# Patient Record
Sex: MALE | Race: BLACK OR AFRICAN AMERICAN | NOT HISPANIC OR LATINO | URBAN - METROPOLITAN AREA
[De-identification: names, ages, dates, MRNs, and addresses within clinical notes are randomized per-mention and may not be internally consistent; named-entity substitution may affect disease eponyms.]

---

## 2021-08-13 ENCOUNTER — IMMUNIZATION (OUTPATIENT)
Dept: PRIMARY CARE CLINIC | Facility: CLINIC | Age: 56
End: 2021-08-13

## 2021-08-13 DIAGNOSIS — Z23 NEED FOR VACCINATION: Primary | ICD-10-CM

## 2021-08-13 PROCEDURE — 0031A COVID-19,VECTOR-NR,RS-AD26,PF,0.5 ML DOSE VACCINE (JANSSEN): CPT | Mod: CV19,S$GLB,, | Performed by: INTERNAL MEDICINE

## 2021-08-13 PROCEDURE — 0031A COVID-19,VECTOR-NR,RS-AD26,PF,0.5 ML DOSE VACCINE (JANSSEN): ICD-10-PCS | Mod: CV19,S$GLB,, | Performed by: INTERNAL MEDICINE

## 2021-08-13 PROCEDURE — 91303 COVID-19,VECTOR-NR,RS-AD26,PF,0.5 ML DOSE VACCINE (JANSSEN): ICD-10-PCS | Mod: S$GLB,,, | Performed by: INTERNAL MEDICINE

## 2021-08-13 PROCEDURE — 91303 COVID-19,VECTOR-NR,RS-AD26,PF,0.5 ML DOSE VACCINE (JANSSEN): CPT | Mod: S$GLB,,, | Performed by: INTERNAL MEDICINE

## 2023-10-17 PROBLEM — M35.01: Status: ACTIVE | Noted: 2023-10-17

## 2023-10-17 PROBLEM — H16.221: Status: ACTIVE | Noted: 2023-10-17

## 2023-10-17 PROBLEM — H52.203 ASTIGMATISM, BILATERAL: Status: ACTIVE | Noted: 2023-10-17

## 2023-10-17 PROBLEM — H52.13 BILATERAL MYOPIA: Status: ACTIVE | Noted: 2023-10-17

## 2023-10-17 PROBLEM — H52.4 BILATERAL PRESBYOPIA: Status: ACTIVE | Noted: 2023-10-17

## 2023-10-17 PROBLEM — D23.9 HYDROCYSTOMA: Status: ACTIVE | Noted: 2023-10-17

## 2023-10-17 PROBLEM — H02.821 CYST OF RIGHT UPPER EYELID: Status: ACTIVE | Noted: 2023-10-17

## 2023-10-17 PROBLEM — H01.009 BLEPHARITIS: Status: ACTIVE | Noted: 2023-10-17

## 2023-10-17 RX ORDER — MUPIROCIN 20 MG/G
OINTMENT TOPICAL
COMMUNITY
Start: 2021-09-23 | End: 2024-05-16 | Stop reason: ALTCHOICE

## 2023-10-17 RX ORDER — GABAPENTIN 300 MG/1
CAPSULE ORAL
COMMUNITY
Start: 2021-10-27 | End: 2024-05-16 | Stop reason: ALTCHOICE

## 2023-10-17 RX ORDER — ATORVASTATIN CALCIUM 20 MG/1
20 TABLET, FILM COATED ORAL
COMMUNITY
Start: 2021-06-30

## 2023-10-17 RX ORDER — ERYTHROMYCIN 5 MG/G
OINTMENT OPHTHALMIC
COMMUNITY
Start: 2021-07-12 | End: 2024-05-16 | Stop reason: ALTCHOICE

## 2023-10-18 ENCOUNTER — ANCILLARY PROCEDURE (OUTPATIENT)
Dept: RADIOLOGY | Facility: CLINIC | Age: 58
End: 2023-10-18
Payer: COMMERCIAL

## 2023-10-18 ENCOUNTER — OFFICE VISIT (OUTPATIENT)
Dept: ORTHOPEDIC SURGERY | Facility: CLINIC | Age: 58
End: 2023-10-18
Payer: COMMERCIAL

## 2023-10-18 DIAGNOSIS — M79.641 HAND PAIN, RIGHT: ICD-10-CM

## 2023-10-18 DIAGNOSIS — M65.351 TRIGGER LITTLE FINGER OF RIGHT HAND: Primary | ICD-10-CM

## 2023-10-18 PROCEDURE — 73130 X-RAY EXAM OF HAND: CPT | Mod: RIGHT SIDE | Performed by: RADIOLOGY

## 2023-10-18 PROCEDURE — 99203 OFFICE O/P NEW LOW 30 MIN: CPT | Performed by: FAMILY MEDICINE

## 2023-10-18 PROCEDURE — 20550 NJX 1 TENDON SHEATH/LIGAMENT: CPT | Performed by: FAMILY MEDICINE

## 2023-10-18 PROCEDURE — 73130 X-RAY EXAM OF HAND: CPT | Mod: RT,FY

## 2023-10-18 RX ORDER — TRIAMCINOLONE ACETONIDE 40 MG/ML
20 INJECTION, SUSPENSION INTRA-ARTICULAR; INTRAMUSCULAR
Status: COMPLETED | OUTPATIENT
Start: 2023-10-18 | End: 2023-10-18

## 2023-10-18 RX ORDER — LIDOCAINE HYDROCHLORIDE 10 MG/ML
0.5 INJECTION INFILTRATION; PERINEURAL
Status: COMPLETED | OUTPATIENT
Start: 2023-10-18 | End: 2023-10-18

## 2023-10-18 RX ADMIN — TRIAMCINOLONE ACETONIDE 20 MG: 40 INJECTION, SUSPENSION INTRA-ARTICULAR; INTRAMUSCULAR at 13:40

## 2023-10-18 RX ADMIN — LIDOCAINE HYDROCHLORIDE 0.5 ML: 10 INJECTION INFILTRATION; PERINEURAL at 13:40

## 2023-10-18 NOTE — PROGRESS NOTES
** Please excuse any errors in grammar or translation related to this dictation. Voice recognition software was utilized to prepare this document. **    Assessment & Plan:  Clinical presentation most consistent with trigger finger of right 5th finger. Explained to patient that this is due to finger flexor tendon becoming impinged within its pulley system.  Reviewed treatment options to include observation, wearing of trigger finger splint, and steroid injection. As patient feels symptoms have progressed since onset, elected for steroid injection as performed below. Discussed with patient that associated pain should soon resolve but that catching/locking symptoms can take a few weeks.  For persistence or recurrence of symptoms, may benefit from surgical release. Return precautions given. F/u as needed.    Chief complaint: R pinky locking    HPI:  58-year-old male right-hand-dominant presents with locking of the right pinky finger.  This has been ongoing for the past month.  Prior to that noted pain within his pinky dating back at least a year.  He reports a remote history of hand trauma while playing baseball as a youth which caused deformities in his third through fifth digits.  However the pain over the past year and now the locking are new symptoms.    Exam:  Right hand reveals  ulnar deviation of 3rd and 5th fingers. Tenderness over the A1 pulleys of the right little finger. Active triggering with flexion and extension today of little finger. Subjective sensation is normal.  - Tinel's of median nerve.   Normal r/u/m nerve motor activation.   2+ radial and ulnar pulses.     Results:  X-rays of right hand reviewed and interpreted as no acute fracture.  Chronic abnormalities of the third and fifth digits, suspect remote fifth PIP dislocation event.    Procedure:  Patient ID: Yakov Mendes is a 58 y.o. male.    Hand / UE Inj/Asp: R small A1 for trigger finger on 10/18/2023 1:40 PM  Indications: pain and tendon  swelling  Details: 25 G needle, volar approach  Medications: 0.5 mL lidocaine 10 mg/mL (1 %); 20 mg triamcinolone acetonide 40 mg/mL  Outcome: tolerated well, no immediate complications  Procedure, treatment alternatives, risks and benefits explained, specific risks discussed. Consent was given by the patient. Immediately prior to procedure a time out was called to verify the correct patient, procedure, equipment, support staff and site/side marked as required. Patient was prepped and draped in the usual sterile fashion.

## 2023-11-27 ENCOUNTER — OFFICE VISIT (OUTPATIENT)
Dept: OTOLARYNGOLOGY | Facility: HOSPITAL | Age: 58
End: 2023-11-27
Payer: COMMERCIAL

## 2023-11-27 VITALS — TEMPERATURE: 97.9 F | WEIGHT: 230.3 LBS | HEIGHT: 72 IN | BODY MASS INDEX: 31.19 KG/M2

## 2023-11-27 DIAGNOSIS — R13.12 OROPHARYNGEAL DYSPHAGIA: ICD-10-CM

## 2023-11-27 DIAGNOSIS — L29.9 EAR ITCHING: ICD-10-CM

## 2023-11-27 DIAGNOSIS — R09.A2 GLOBUS PHARYNGEUS: ICD-10-CM

## 2023-11-27 DIAGNOSIS — K21.9 LARYNGOPHARYNGEAL REFLUX (LPR): Primary | ICD-10-CM

## 2023-11-27 PROCEDURE — 99203 OFFICE O/P NEW LOW 30 MIN: CPT | Performed by: OTOLARYNGOLOGY

## 2023-11-27 PROCEDURE — 99213 OFFICE O/P EST LOW 20 MIN: CPT | Performed by: OTOLARYNGOLOGY

## 2023-11-27 PROCEDURE — 31579 LARYNGOSCOPY TELESCOPIC: CPT | Performed by: OTOLARYNGOLOGY

## 2023-11-27 RX ORDER — SILDENAFIL 100 MG/1
TABLET, FILM COATED ORAL
COMMUNITY
Start: 2022-04-04

## 2023-11-27 RX ORDER — ACETAMINOPHEN 500 MG
1000 TABLET ORAL 3 TIMES DAILY
COMMUNITY
Start: 2022-03-26 | End: 2024-05-16 | Stop reason: ALTCHOICE

## 2023-11-27 RX ORDER — DOCUSATE SODIUM 100 MG/1
100 CAPSULE, LIQUID FILLED ORAL 2 TIMES DAILY
COMMUNITY
Start: 2022-03-24 | End: 2024-05-16 | Stop reason: ALTCHOICE

## 2023-11-27 RX ORDER — AMLODIPINE BESYLATE 5 MG/1
TABLET ORAL DAILY
COMMUNITY
Start: 2023-09-01

## 2023-11-27 RX ORDER — MAGNESIUM CARB/ALUMINUM HYDROX 105-160MG
1 TABLET,CHEWABLE ORAL
Qty: 270 TABLET | Refills: 0 | Status: SHIPPED | OUTPATIENT
Start: 2023-11-27 | End: 2024-02-25

## 2023-11-27 RX ORDER — POLYMYXIN B SULFATE AND TRIMETHOPRIM 1; 10000 MG/ML; [USP'U]/ML
SOLUTION OPHTHALMIC
COMMUNITY
Start: 2021-03-09 | End: 2024-05-16 | Stop reason: ALTCHOICE

## 2023-11-27 RX ORDER — TRIAMTERENE AND HYDROCHLOROTHIAZIDE 37.5; 25 MG/1; MG/1
1 CAPSULE ORAL
COMMUNITY
Start: 2023-09-01

## 2023-11-27 RX ORDER — FENOFIBRATE 48 MG/1
48 TABLET, FILM COATED ORAL
COMMUNITY
Start: 2023-09-01

## 2023-11-27 RX ORDER — LORATADINE 10 MG/1
10 TABLET ORAL
COMMUNITY
Start: 2021-12-29 | End: 2024-05-16 | Stop reason: ALTCHOICE

## 2023-11-27 RX ORDER — DEXAMETHASONE SODIUM PHOSPHATE 1 MG/ML
SOLUTION/ DROPS OPHTHALMIC
COMMUNITY
Start: 2022-04-04 | End: 2024-05-16 | Stop reason: ALTCHOICE

## 2023-11-27 RX ORDER — OMEPRAZOLE 20 MG/1
40 CAPSULE, DELAYED RELEASE ORAL
COMMUNITY
Start: 2019-03-19 | End: 2024-05-16 | Stop reason: ALTCHOICE

## 2023-11-27 RX ORDER — HEPARIN SODIUM 5000 [USP'U]/ML
5000 INJECTION, SOLUTION INTRAVENOUS; SUBCUTANEOUS 2 TIMES DAILY
COMMUNITY
Start: 2022-03-24 | End: 2024-05-16 | Stop reason: ALTCHOICE

## 2023-11-27 RX ORDER — AMLODIPINE BESYLATE 10 MG/1
10 TABLET ORAL
COMMUNITY
Start: 2022-04-04 | End: 2023-11-27 | Stop reason: DRUGHIGH

## 2023-11-27 RX ORDER — NALOXONE HYDROCHLORIDE 4 MG/.1ML
SPRAY NASAL
COMMUNITY
Start: 2022-03-24 | End: 2024-05-16 | Stop reason: ALTCHOICE

## 2023-11-27 RX ORDER — CIPROFLOXACIN HYDROCHLORIDE AND HYDROCORTISONE 2; 10 MG/ML; MG/ML
SUSPENSION AURICULAR (OTIC)
COMMUNITY
Start: 2023-04-03 | End: 2024-05-16 | Stop reason: ALTCHOICE

## 2023-11-27 RX ORDER — FLUTICASONE PROPIONATE 50 MCG
SPRAY, SUSPENSION (ML) NASAL
COMMUNITY
Start: 2023-09-01 | End: 2024-05-16 | Stop reason: ALTCHOICE

## 2023-11-27 ASSESSMENT — PATIENT HEALTH QUESTIONNAIRE - PHQ9
2. FEELING DOWN, DEPRESSED OR HOPELESS: NOT AT ALL
SUM OF ALL RESPONSES TO PHQ9 QUESTIONS 1 & 2: 0
1. LITTLE INTEREST OR PLEASURE IN DOING THINGS: NOT AT ALL

## 2023-11-27 NOTE — PROGRESS NOTES
ASSESSMENT AND PLAN:   Yakov Mendes is a 58 y.o. male presenting for an initial visit with findings of discomfort in the ears that has been ongoing with itchiness. He uses q-tips frequently. He tries to massae the ear canals wit little benefit.   He reports swallow dysfunction with thickened mucus in the morning    Today's examination to include stroboscopy demonstrates a contusion on the left TM and hair on the TM on the right. He has bilateral vocal cord mobility and mild MTD and reflux related changes.     We discussed avoiding qtip use and use of steroid creams and mineral oil. We recommended adding Gaviscon to his current reflux management strategies.    I would like to see the patient back as needed.        Reason For Consult  Chief Complaint   Patient presents with    Follow-up     Difficulty swallowing.          HISTORY OF PRESENT ILLNESS:  Yakov Mendes is a 58 y.o. male presenting for an initial visit with me for swallowing dysfunction.      The patient reports discomfort in the neck that radiates to the ear. This has been present for a year. He reports that he has to shake his ear to improve the symptoms. He has a sensation of deep itch in the ear. He uses q-tips to clean his ears. No muffled hearing.       He has swallowing issues, worse in the morning. This is both with solids and liquids. He reports choking and regurgitation improve his symptoms.      He mistrusts medical doctors due to questionable nerve pain after a complication of medical care previously   Past Medical History  He has a past medical history of Personal history of other diseases of the circulatory system and Personal history of other endocrine, nutritional and metabolic disease. Surgical History  He has a past surgical history that includes Other surgical history (03/11/2021) and CT angio neck (12/4/2018).   Social History  He has no history on file for tobacco use, alcohol use, and drug use. Allergies  Patient has no  known allergies.     Family History  Family History   Problem Relation Name Age of Onset    Diabetes Mother      Diabetes Father          Review of Systems  All 10 systems were reviewed and negative except for above.      Physical Exam    ENT Physical Exam  Constitutional  Appearance: patient appears well-developed and well-nourished,  Head and Face  Appearance: head appears normal and face appears normal;  Ear  Auricles: right auricle normal; left auricle normal;  Ear comments: Left TM with contusion; hair on the right TM  Nose  External Nose: nares patent bilaterally;  Oral Cavity/Oropharynx  Lips: normal;  Neck  Neck: neck normal; neck palpation normal;  Respiratory  Inspection: no retractions visible;  Cardiovascular  Inspection: no peripheral edema present;  Neurovestibular  Mental Status: alert and oriented;  Psychiatric: mood normal;  Cranial Nerves: cranial nerves intact;        Last Recorded Vitals  There were no vitals taken for this visit.       ----------------------------------------------------------------------  Procedures     Flexible Laryngoscopy w/ Videostroboscopy    VOICE AND SPEECH CHARACTERISTICS:  Normal spoken speech, (+) mild dysphonia, (+) mild roughness, no breathiness, no asthenia, (+) mild strain.    Additional Voice Characteristics: glottal joseph  Intelligibility: normal.   Resonance: balanced.   Vocal Loudness: reduced.   Breath Support: poor coordination.    PROCEDURE:    Indications: voice change  PROCEDURE NOTE: FLEXIBLE LARYNGOSCOPY WITH STROBOSCOPY  I recommended a flexible laryngoscopy with stroboscopy based on PE findings, and/or concern for mucosal wave details based upon history and/or for issues associated with hyperreflexic gag on mirror exam concerning for pathology. Risks, benefits, and alternatives were explained. The patient wishes to proceed and gives verbal consent.   Patient is seated in the exam chair. After adequate topical anesthesia, I advance the flexible  endoscope. The examination included evaluation of the mcgarry, vallecula, base of tongue, pyriforms, post-cricoid area, larynx and immediate subglottis.  Findings : assessment of the nasopharynx, base of tongue/vallecula, pyriform sinuses, post-cricoid area and pharyngeal walls was without lesion or mass, pharyngeal wall contraction is normal and symmetric, and no pooling of secretions  subglottic edema:2 (present)  Gross Arytenoid Movement: symmetric.  Arytenoid Height: normal.   Supraglottic Tension: lateral.  Symmetry: asymmetry.   Amplitude: reduced: left.  Phase Closure: in-phase.  Mucosal Wave Lateral Excursion/Secondary Wave: Left Vocal Cord: mild restriction - Wave moved more than ¼, less than ½ the width of the vocal fold.  Periodicity: aperiodic.  Closure: closed.        Time Spent  Prep time on day of patient encounter: 10 minutes  Time spent directly with patient, family or caregiver: 25 minutes  Additional Time Spent on Patient Care Activities: 5 minutes  Documentation Time: 10 minutes  Other Time Spent: 0 minutes  Total: 50 minutes     Scribe Attestation  By signing my name below, Sadia SPANGLER , Scribe attest that this documentation has been prepared under the direction and in the presence of Yousif Echeverria MD.

## 2024-02-02 ENCOUNTER — HOSPITAL ENCOUNTER (OUTPATIENT)
Dept: RADIOLOGY | Facility: HOSPITAL | Age: 59
Discharge: HOME | End: 2024-02-02
Payer: COMMERCIAL

## 2024-02-02 DIAGNOSIS — N50.811 RIGHT TESTICULAR PAIN: ICD-10-CM

## 2024-02-02 PROCEDURE — 93975 VASCULAR STUDY: CPT

## 2024-02-02 PROCEDURE — 76870 US EXAM SCROTUM: CPT

## 2024-02-02 PROCEDURE — 76870 US EXAM SCROTUM: CPT | Performed by: STUDENT IN AN ORGANIZED HEALTH CARE EDUCATION/TRAINING PROGRAM

## 2024-02-05 NOTE — ADDENDUM NOTE
Encounter addended by: Liana Gracia on: 2/5/2024 1:53 PM   Actions taken: Imaging Exam ended, Charge Capture section accepted

## 2024-05-03 ENCOUNTER — OFFICE VISIT (OUTPATIENT)
Dept: ORTHOPEDIC SURGERY | Facility: HOSPITAL | Age: 59
End: 2024-05-03
Payer: COMMERCIAL

## 2024-05-03 DIAGNOSIS — M15.2 DEGENERATIVE ARTHRITIS OF PROXIMAL INTERPHALANGEAL JOINT OF LITTLE FINGER OF RIGHT HAND: Primary | ICD-10-CM

## 2024-05-03 PROCEDURE — 99204 OFFICE O/P NEW MOD 45 MIN: CPT | Performed by: ORTHOPAEDIC SURGERY

## 2024-05-03 PROCEDURE — 99214 OFFICE O/P EST MOD 30 MIN: CPT | Performed by: ORTHOPAEDIC SURGERY

## 2024-05-03 NOTE — PROGRESS NOTES
CHIEF COMPLAINT        Right small finger pain    ASSESSMENT + PLAN    Right little PIP posttraumatic arthritis    We had a long discussion about the nature of the condition and the typical progressive natural history.  I reviewed nonoperative measures including Tylenol or anti-inflammatories, ice or heat, splinting, and the occasional cortisone injection.  I also reviewed the surgical option of PIP Silastic arthroplasty, along with the major risks and benefits of that surgery.  I think it would improve the pain and the alignment, though it will not restore full 0-90 motion arc.  More typically it is about half of that.  Patient is going to consider the surgery, which would be done under general anesthetic at the location of his convenience.  He will contact the office if he wants to schedule.    Follow-up with any additional concerns.        HISTORY OF PRESENT ILLNESS       Patient is a 59 y.o. left-hand dominant male, who presents today at suggestion of Dr. Samano for evaluation of a right small finger deformity.  This has been present for several months and gradually progressive and more bothersome.  No single specific recalled trauma around time of onset.  No numbness or tingling.  He does have mild arthritic deformity of other fingers but the small finger is by far the most bothersome due to the ulnar angulation of the digit.  No popping or clicking.    He is not diabetic or hypothyroid.  He does not smoke.      REVIEW OF SYSTEMS       A 30-item multi-system Review Of Systems was obtained on today's intake form.  This was reviewed with the patient and is correct.  The pertinent positives and negatives are listed above.  The form has been scanned separately into the medical record.      PHYSICAL EXAM    Constitutional:    Appears stated age. Well-developed and well-nourished overweight male in no acute distress.  Psychiatric:         Pleasant normal mood and affect. Behavior is appropriate for the situation.    Head:                   Normocephalic and atraumatic.  Eyes:                    Pupils are equal and round.  Cardiovascular:  2+ radial and ulnar pulses. Fingers well-perfused.  Respiratory:        Effort normal. No respiratory distress. Speaking in complete sentences.  Neurologic:       Alert and oriented to person, place, and time.  Skin:                Skin is intact, warm and dry.  Hematologic / Lymphatic:    No lymphedema or lymphangitis.    Extremities / Musculoskeletal:                      Skin of right small finger and hand is intact with no erythema, ecchymosis, or diffuse swelling.  Normal skin drag and coloration.  Full composite finger extension with hyperextension at the PIP and swan-neck posture.  About 20 degree ulnar deviation through the PIP.  This is passively correctable.  Composite flexion lacks about 2 cm of the crease.  No scissoring on flexion.  Tendons are intact individual testing.  Sensation intact to light touch in all distributions.  Capillary refill less than 2 seconds.  Symmetric wrist and forearm motion.  Negative Rick.  Negative midcarpal shift.      IMAGING / LABS / EMGs           X-rays right hand from October 18, 2023 were independently interpreted by me today and confirm narrowing, sclerosis, and ulnar deviation through the right small PIP.  Dorsal subluxation visible on the lateral.  More mild deformity at the long PIP.  Other joints are well-preserved.      Past Medical History:   Diagnosis Date    Personal history of other diseases of the circulatory system     History of hypertension    Personal history of other endocrine, nutritional and metabolic disease     History of hyperlipidemia       Medication Documentation Review Audit       Reviewed by Bobbi Drew MA (Medical Assistant) on 11/27/23 at 1301      Medication Order Taking? Sig Documenting Provider Last Dose Status   acetaminophen (Tylenol) 500 mg tablet [id number] Yes Take 2 tablets (1,000 mg) by mouth 3 times  a day. Historical MD Norm Taking Active   Discontinued 11/27/23 1300   amLODIPine (Norvasc) 5 mg tablet [id number] Yes  Historical Provider, MD Taking Active   atorvastatin (Lipitor) 20 mg tablet [id number] Yes Take 1 tablet (20 mg) by mouth. Silvana Zheng MD Taking Active   CHOLESTEROL ORAL [id number] Yes Take by mouth. Silvana Zheng MD Taking Active   Cipro HC otic suspension [id number] No  Historical MD Norm Not Taking Active   dexAMETHasone (Decadron) 0.1 % ophthalmic solution [id number] Yes Apply 3 drops to each ear twice daily Historical MD Norm Taking Active   docusate sodium (Colace) 100 mg capsule [id number] Yes Take 1 capsule (100 mg) by mouth twice a day. Historical MD Norm Taking Active   erythromycin (Romycin) 5 mg/gram (0.5 %) ophthalmic ointment [id number] Yes APPLY 1 STRIP Every twelve hours Silvana Zheng MD Taking Active   fenofibrate (Tricor) 48 mg tablet [id number] Yes Take 1 tablet (48 mg) by mouth once daily. Historical MD Norm Taking Active   fluticasone (Flonase) 50 mcg/actuation nasal spray [id number] Yes  Historical Provider, MD Taking Active   gabapentin (Neurontin) 300 mg capsule [id number] Yes Take by mouth. Historical MD Norm Taking Active   heparin sodium,porcine (heparin, porcine,) 5,000 unit/mL injection [id number] Yes Inject 1 mL (5,000 Units) under the skin twice a day. Historical MD Norm Taking Active   loratadine (Claritin) 10 mg tablet [id number] Yes Take 1 tablet (10 mg) by mouth. Silvana Zheng MD Taking Active   mupirocin (Bactroban) 2 % ointment [id number] Yes Mupirocin 2 % External Ointment   Quantity: 15  Refills: 0        Start : 23-Sep-2021   Active Historical MD Norm Taking Active   naloxone (Narcan) 4 mg/0.1 mL nasal spray [id number] Yes Use 1 spray in one nostril as needed for overdose. May repeat every 2 to 3 min in alternating nostrils until medical assistance is available Historical MD Norm Taking Active    omeprazole (PriLOSEC) 20 mg DR capsule [id number] Yes Take 2 capsules (40 mg) by mouth. Historical Provider, MD Taking Active   polymyxin B sulf-trimethoprim (Polytrim) ophthalmic solution [id number] Yes  Historical Provider, MD Taking Active   sildenafil (Viagra) 100 mg tablet [id number] Yes Take 1 tablet by mouth daily as needed 30-60 minutes before intercourse Historical Provider, MD Taking Active   sodium chloride (Ayr) aerosol,spray nasal spray [id number] Yes Administer 1 bottle into affected nostril(s) 4 times a day. Historical Provider, MD Taking Active   triamterene-hydrochlorothiazid (Dyazide) 37.5-25 mg capsule [id number] Yes Take 1 capsule by mouth once daily. Historical Provider, MD Taking Active                    No Known Allergies    Social History     Socioeconomic History    Marital status: Single     Spouse name: Not on file    Number of children: Not on file    Years of education: Not on file    Highest education level: Not on file   Occupational History    Not on file   Tobacco Use    Smoking status: Some Days     Types: Cigars    Smokeless tobacco: Never   Substance and Sexual Activity    Alcohol use: Not on file    Drug use: Not on file    Sexual activity: Not on file   Other Topics Concern    Not on file   Social History Narrative    Not on file     Social Determinants of Health     Financial Resource Strain: Low Risk  (11/20/2020)    Received from [facility]    Overall Financial Resource Strain (CARDIA)     Difficulty of Paying Living Expenses: Not hard at all   Food Insecurity: No Food Insecurity (11/20/2020)    Received from [facility]    Hunger Vital Sign     Worried About Running Out of Food in the Last Year: Never true     Ran Out of Food in the Last Year: Never true   Transportation Needs: No Transportation Needs (11/20/2020)    Received from [facility]    PRAPARE - Transportation     Lack of Transportation (Medical): No     Lack of Transportation (Non-Medical): No    Physical Activity: Not on File (2019)    Received from [facility]     Physical Activity     Physical Activity: 0   Stress: Not on File (2019)    Received from [facility]     Stress     Stress: 0   Social Connections: Not on File (2019)    Received from [facility]     Social Connections     Social Connections and Isolation: 0   Intimate Partner Violence: Not on file   Housing Stability: Not on File (2019)    Received from [facility]     Housing Stability     [place]ousin       Past Surgical History:   Procedure Laterality Date    CT ANGIO NECK  2018    CT NECK ANGIO W AND WO IV CONTRAST 2018 [facility]    OTHER SURGICAL HISTORY  2021    Full thickness skin graft     SURGICAL INDICATION    I reviewed the options for further management of this condition and the likely success rates of each.  The patient feels that they have maximized the benefits of conservative care, and they do want to go on to surgery.    I reviewed the major risks of surgery including infection; scarring; damage to nerves, tendons, or vessels; stiffness; failure to relieve symptoms, recurrent symptoms, fracture, implant fracture, and wound healing problems, as well as anesthesia risks.  I answered their questions to their satisfaction.  They were given my contact information and will contact the office when they are ready to schedule an exact surgical date.  Surgery will be posted as follows :    Dx :          Right small PIP arthritis  ICD-10 :      M15.2  Procedure :     Right small PIP Silastic arthroplasty  CPT :        37037  Anesth :    General  Location :   [facility]  Duration :    90 minutes  Specials :    Integra Silastic arthroplasty, mini C arm  PAT :       No  Post-Op Visit :    10-15 days      Electronically Signed      CHENG Manuel MD      Orthopaedic Hand Surgery      [phone]

## 2024-05-03 NOTE — LETTER
May 12, 2024       No Recipients    Patient: Yakov Mendes   YOB: 1965   Date of Visit: 5/3/2024       Dear Dr. Hoyt Recipients:    Thank you for referring Yakov Mendes to me for evaluation. Below are my notes for this consultation.  If you have questions, please do not hesitate to call me. I look forward to following your patient along with you.       Sincerely,     Mike Manuel MD      CC:   No Recipients  ______________________________________________________________________________________    CHIEF COMPLAINT        Right small finger pain    ASSESSMENT + PLAN    Right little PIP posttraumatic arthritis    We had a long discussion about the nature of the condition and the typical progressive natural history.  I reviewed nonoperative measures including Tylenol or anti-inflammatories, ice or heat, splinting, and the occasional cortisone injection.  I also reviewed the surgical option of PIP Silastic arthroplasty, along with the major risks and benefits of that surgery.  I think it would improve the pain and the alignment, though it will not restore full 0-90 motion arc.  More typically it is about half of that.  Patient is going to consider the surgery, which would be done under general anesthetic at the location of his convenience.  He will contact the office if he wants to schedule.    Follow-up with any additional concerns.        HISTORY OF PRESENT ILLNESS       Patient is a 59 y.o. left-hand dominant male, who presents today at suggestion of Dr. Samano for evaluation of a right small finger deformity.  This has been present for several months and gradually progressive and more bothersome.  No single specific recalled trauma around time of onset.  No numbness or tingling.  He does have mild arthritic deformity of other fingers but the small finger is by far the most bothersome due to the ulnar angulation of the digit.  No popping or clicking.    He is not diabetic or hypothyroid.  He does  not smoke.      REVIEW OF SYSTEMS       A 30-item multi-system Review Of Systems was obtained on today's intake form.  This was reviewed with the patient and is correct.  The pertinent positives and negatives are listed above.  The form has been scanned separately into the medical record.      PHYSICAL EXAM    Constitutional:    Appears stated age. Well-developed and well-nourished overweight male in no acute distress.  Psychiatric:         Pleasant normal mood and affect. Behavior is appropriate for the situation.   Head:                   Normocephalic and atraumatic.  Eyes:                    Pupils are equal and round.  Cardiovascular:  2+ radial and ulnar pulses. Fingers well-perfused.  Respiratory:        Effort normal. No respiratory distress. Speaking in complete sentences.  Neurologic:       Alert and oriented to person, place, and time.  Skin:                Skin is intact, warm and dry.  Hematologic / Lymphatic:    No lymphedema or lymphangitis.    Extremities / Musculoskeletal:                      Skin of right small finger and hand is intact with no erythema, ecchymosis, or diffuse swelling.  Normal skin drag and coloration.  Full composite finger extension with hyperextension at the PIP and swan-neck posture.  About 20 degree ulnar deviation through the PIP.  This is passively correctable.  Composite flexion lacks about 2 cm of the crease.  No scissoring on flexion.  Tendons are intact individual testing.  Sensation intact to light touch in all distributions.  Capillary refill less than 2 seconds.  Symmetric wrist and forearm motion.  Negative Rick.  Negative midcarpal shift.      IMAGING / LABS / EMGs           X-rays right hand from October 18, 2023 were independently interpreted by me today and confirm narrowing, sclerosis, and ulnar deviation through the right small PIP.  Dorsal subluxation visible on the lateral.  More mild deformity at the long PIP.  Other joints are well-preserved.      Past  Medical History:   Diagnosis Date   • Personal history of other diseases of the circulatory system     History of hypertension   • Personal history of other endocrine, nutritional and metabolic disease     History of hyperlipidemia       Medication Documentation Review Audit       Reviewed by Bobbi Drew MA (Medical Assistant) on 11/27/23 at 1301      Medication Order Taking? Sig Documenting Provider Last Dose Status   acetaminophen (Tylenol) 500 mg tablet [id number] Yes Take 2 tablets (1,000 mg) by mouth 3 times a day. Historical MD Norm Taking Active   Discontinued 11/27/23 1300   amLODIPine (Norvasc) 5 mg tablet [id number] Yes  Historical Provider, MD Taking Active   atorvastatin (Lipitor) 20 mg tablet [id number] Yes Take 1 tablet (20 mg) by mouth. Historical Provider, MD Taking Active   CHOLESTEROL ORAL [id number] Yes Take by mouth. Historical MD Norm Taking Active   Cipro HC otic suspension [id number] No  Historical MD Norm Not Taking Active   dexAMETHasone (Decadron) 0.1 % ophthalmic solution [id number] Yes Apply 3 drops to each ear twice daily Historical MD Nomr Taking Active   docusate sodium (Colace) 100 mg capsule [id number] Yes Take 1 capsule (100 mg) by mouth twice a day. Historical MD Norm Taking Active   erythromycin (Romycin) 5 mg/gram (0.5 %) ophthalmic ointment [id number] Yes APPLY 1 STRIP Every twelve hours Historical MD Norm Taking Active   fenofibrate (Tricor) 48 mg tablet [id number] Yes Take 1 tablet (48 mg) by mouth once daily. Historical MD Norm Taking Active   fluticasone (Flonase) 50 mcg/actuation nasal spray [id number] Yes  Historical MD Norm Taking Active   gabapentin (Neurontin) 300 mg capsule [id number] Yes Take by mouth. Historical Provider, MD Taking Active   heparin sodium,porcine (heparin, porcine,) 5,000 unit/mL injection [id number] Yes Inject 1 mL (5,000 Units) under the skin twice a day. Historical Provider, MD Taking Active   loratadine (Claritin) 10  mg tablet [id number] Yes Take 1 tablet (10 mg) by mouth. Historical Provider, MD Taking Active   mupirocin (Bactroban) 2 % ointment [id number] Yes Mupirocin 2 % External Ointment   Quantity: 15  Refills: 0        Start : 23-Sep-2021   Active Historical Provider, MD Taking Active   naloxone (Narcan) 4 mg/0.1 mL nasal spray [id number] Yes Use 1 spray in one nostril as needed for overdose. May repeat every 2 to 3 min in alternating nostrils until medical assistance is available Historical Provider, MD Taking Active   omeprazole (PriLOSEC) 20 mg DR capsule [id number] Yes Take 2 capsules (40 mg) by mouth. Historical Provider, MD Taking Active   polymyxin B sulf-trimethoprim (Polytrim) ophthalmic solution [id number] Yes  Historical Provider, MD Taking Active   sildenafil (Viagra) 100 mg tablet [id number] Yes Take 1 tablet by mouth daily as needed 30-60 minutes before intercourse Historical Provider, MD Taking Active   sodium chloride (Ayr) aerosol,spray nasal spray [id number] Yes Administer 1 bottle into affected nostril(s) 4 times a day. Historical Provider, MD Taking Active   triamterene-hydrochlorothiazid (Dyazide) 37.5-25 mg capsule [id number] Yes Take 1 capsule by mouth once daily. Historical Provider, MD Taking Active                    No Known Allergies    Social History     Socioeconomic History   • Marital status: Single     Spouse name: Not on file   • Number of children: Not on file   • Years of education: Not on file   • Highest education level: Not on file   Occupational History   • Not on file   Tobacco Use   • Smoking status: Some Days     Types: Cigars   • Smokeless tobacco: Never   Substance and Sexual Activity   • Alcohol use: Not on file   • Drug use: Not on file   • Sexual activity: Not on file   Other Topics Concern   • Not on file   Social History Narrative   • Not on file     Social Determinants of Health     Financial Resource Strain: Low Risk  (11/20/2020)    Received from [facility]    Overall  Financial Resource Strain (CARDIA)    • Difficulty of Paying Living Expenses: Not hard at all   Food Insecurity: No Food Insecurity (2020)    Received from [facility]    Hunger Vital Sign    • Worried About Running Out of Food in the Last Year: Never true    • Ran Out of Food in the Last Year: Never true   Transportation Needs: No Transportation Needs (2020)    Received from [facility]    PRAPARE - Transportation    • Lack of Transportation (Medical): No    • Lack of Transportation (Non-Medical): No   Physical Activity: Not on File (2019)    Received from [facility]     Physical Activity    • Physical Activity: 0   Stress: Not on File (2019)    Received from [facility]     Stress    • Stress: 0   Social Connections: Not on File (2019)    Received from [facility]     Social Connections    • Social Connections and Isolation: 0   Intimate Partner Violence: Not on file   Housing Stability: Not on File (2019)    Received from [facility]     Housing Stability    • [place]       Past Surgical History:   Procedure Laterality Date   • CT ANGIO NECK  2018    CT NECK ANGIO W AND WO IV CONTRAST [place] [facility]   • OTHER SURGICAL HISTORY  2021    Full thickness skin graft     SURGICAL INDICATION    I reviewed the options for further management of this condition and the likely success rates of each.  The patient feels that they have maximized the benefits of conservative care, and they do want to go on to surgery.    I reviewed the major risks of surgery including infection; scarring; damage to nerves, tendons, or vessels; stiffness; failure to relieve symptoms, recurrent symptoms, fracture, implant fracture, and wound healing problems, as well as anesthesia risks.  I answered their questions to their satisfaction.  They were given my contact information and will contact the office when they are ready to schedule an exact surgical date.  Surgery will be posted as follows  :    Dx :          Right small PIP arthritis  ICD-10 :      M15.2  Procedure :     Right small PIP Silastic arthroplasty  CPT :        90385  Anesth :    General  Location :   [facility]  Duration :    90 minutes  Specials :    Integra Silastic arthroplasty, mini C arm  PAT :       No  Post-Op Visit :    10-15 days      Electronically Signed      CHENG Manuel MD      Orthopaedic Hand Surgery      [phone]

## 2024-05-12 PROBLEM — M15.2 DEGENERATIVE ARTHRITIS OF PROXIMAL INTERPHALANGEAL JOINT OF LITTLE FINGER OF RIGHT HAND: Status: ACTIVE | Noted: 2024-05-12

## 2024-05-13 DIAGNOSIS — M15.2 BOUCHARD'S NODES: ICD-10-CM

## 2024-05-16 ENCOUNTER — LAB (OUTPATIENT)
Dept: LAB | Facility: LAB | Age: 59
End: 2024-05-16
Payer: COMMERCIAL

## 2024-05-16 ENCOUNTER — PRE-ADMISSION TESTING (OUTPATIENT)
Dept: PREADMISSION TESTING | Facility: HOSPITAL | Age: 59
End: 2024-05-16
Payer: COMMERCIAL

## 2024-05-16 VITALS
HEIGHT: 72 IN | TEMPERATURE: 98.8 F | HEART RATE: 100 BPM | DIASTOLIC BLOOD PRESSURE: 82 MMHG | SYSTOLIC BLOOD PRESSURE: 134 MMHG | WEIGHT: 210.1 LBS | RESPIRATION RATE: 14 BRPM | OXYGEN SATURATION: 100 % | BODY MASS INDEX: 28.46 KG/M2

## 2024-05-16 DIAGNOSIS — Z01.818 PREOPERATIVE TESTING: ICD-10-CM

## 2024-05-16 DIAGNOSIS — Z01.818 PREOPERATIVE TESTING: Primary | ICD-10-CM

## 2024-05-16 LAB
ANION GAP SERPL CALC-SCNC: 16 MMOL/L (ref 10–20)
BASOPHILS # BLD AUTO: 0.03 X10*3/UL (ref 0–0.1)
BASOPHILS NFR BLD AUTO: 0.6 %
BUN SERPL-MCNC: 16 MG/DL (ref 6–23)
CALCIUM SERPL-MCNC: 10.2 MG/DL (ref 8.6–10.3)
CHLORIDE SERPL-SCNC: 103 MMOL/L (ref 98–107)
CO2 SERPL-SCNC: 25 MMOL/L (ref 21–32)
CREAT SERPL-MCNC: 1.01 MG/DL (ref 0.5–1.3)
EGFRCR SERPLBLD CKD-EPI 2021: 85 ML/MIN/1.73M*2
EOSINOPHIL # BLD AUTO: 0.38 X10*3/UL (ref 0–0.7)
EOSINOPHIL NFR BLD AUTO: 7.5 %
ERYTHROCYTE [DISTWIDTH] IN BLOOD BY AUTOMATED COUNT: 17.5 % (ref 11.5–14.5)
GLUCOSE SERPL-MCNC: 93 MG/DL (ref 74–99)
HCT VFR BLD AUTO: 47.1 % (ref 41–52)
HGB BLD-MCNC: 14.8 G/DL (ref 13.5–17.5)
IMM GRANULOCYTES # BLD AUTO: 0.01 X10*3/UL (ref 0–0.7)
IMM GRANULOCYTES NFR BLD AUTO: 0.2 % (ref 0–0.9)
LYMPHOCYTES # BLD AUTO: 1.82 X10*3/UL (ref 1.2–4.8)
LYMPHOCYTES NFR BLD AUTO: 36.1 %
MCH RBC QN AUTO: 21.3 PG (ref 26–34)
MCHC RBC AUTO-ENTMCNC: 31.4 G/DL (ref 32–36)
MCV RBC AUTO: 68 FL (ref 80–100)
MONOCYTES # BLD AUTO: 0.53 X10*3/UL (ref 0.1–1)
MONOCYTES NFR BLD AUTO: 10.5 %
NEUTROPHILS # BLD AUTO: 2.27 X10*3/UL (ref 1.2–7.7)
NEUTROPHILS NFR BLD AUTO: 45.1 %
NRBC BLD-RTO: ABNORMAL /100{WBCS}
PLATELET # BLD AUTO: 179 X10*3/UL (ref 150–450)
POTASSIUM SERPL-SCNC: 4 MMOL/L (ref 3.5–5.3)
RBC # BLD AUTO: 6.95 X10*6/UL (ref 4.5–5.9)
SODIUM SERPL-SCNC: 140 MMOL/L (ref 136–145)
WBC # BLD AUTO: 5 X10*3/UL (ref 4.4–11.3)

## 2024-05-16 PROCEDURE — 85025 COMPLETE CBC W/AUTO DIFF WBC: CPT

## 2024-05-16 PROCEDURE — 93005 ELECTROCARDIOGRAM TRACING: CPT

## 2024-05-16 PROCEDURE — 36415 COLL VENOUS BLD VENIPUNCTURE: CPT

## 2024-05-16 PROCEDURE — 80048 BASIC METABOLIC PNL TOTAL CA: CPT

## 2024-05-16 RX ORDER — BISMUTH SUBSALICYLATE 262 MG
1 TABLET,CHEWABLE ORAL DAILY
COMMUNITY

## 2024-05-16 RX ORDER — OMEPRAZOLE 20 MG/1
40 TABLET, DELAYED RELEASE ORAL
COMMUNITY

## 2024-05-16 ASSESSMENT — PAIN SCALES - GENERAL: PAINLEVEL_OUTOF10: 1

## 2024-05-16 ASSESSMENT — PAIN - FUNCTIONAL ASSESSMENT: PAIN_FUNCTIONAL_ASSESSMENT: 0-10

## 2024-05-16 NOTE — CPM/PAT H&P
Patient is an alert and oriented 59 year old scheduled for a  Right Small PIP Silastic Arthroplasty  on 5/20/2024 with Dr. Manuel for  Isabella's Nodules.    The patient reports pressure and pain when he bumps his finger or tries to bend it.  He states his joint is swollen.  He states nothing helps the pain except not bending his finger and not bumping it.  He has had injections in the past.    PMHX includes Essential Hypertension, Pure Hypertriglyceridemia, GERD.      Presents to [facility] today for perioperative risk stratification and optimization.      Review of Systems   Constitutional: Negative for chills, decreased appetite, diaphoresis, fever and malaise/fatigue.   Eyes:  Negative for blurred vision and double vision.   Cardiovascular:  Negative for chest pain, claudication, cyanosis, dyspnea on exertion, irregular heartbeat, leg swelling, near-syncope and palpitations.   Respiratory:  Negative for cough, hemoptysis, shortness of breath and wheezing.    Endocrine: Negative for cold intolerance, heat intolerance, polydipsia, polyphagia and polyuria.   Gastrointestinal:  Negative for abdominal pain, constipation, diarrhea, dysphagia, nausea and vomiting.   Genitourinary:  Negative for bladder incontinence, dysuria, hematuria, incomplete emptying, nocturia, pelvic pain and urgency.   Neurological:  Negative for headaches, light-headedness, paresthesias, sensory change and weakness.   Psychiatric/Behavioral:  Negative for altered mental status.       Vitals and nursing note reviewed.     Physical exam  Constitutional:       Appearance: Normal appearance. He is normal weight.   HENT:      Head: Normocephalic and atraumatic.      Mouth/Throat:      Mouth: Mucous membranes are moist.      Pharynx: Oropharynx is clear.   Eyes:      Extraocular Movements: Extraocular movements intact.      Conjunctiva/sclera: Conjunctivae normal.      Pupils: Pupils are equal, round, and reactive to light.   Cardiovascular:      Rate  and Rhythm: Normal rate and regular rhythm.      Pulses: Normal pulses.      Heart sounds: Normal heart sounds.      No audible carotid bruit  Pulmonary:      Effort: Pulmonary effort is normal.      Breath sounds: Normal breath sounds.   Abdominal:      General: Abdomen is flat. Bowel sounds are normal.      Palpations: Abdomen is soft.   Musculoskeletal:      Cervical back: Normal range of motion and neck supple.   Swollen right 5th finger pip joint  Skin:     General: Skin is warm and dry.      Capillary Refill: Capillary refill takes less than 2 seconds.   Neurological:      General: No focal deficit present.      Mental Status: He is alert and oriented to person, place, and time. Mental status is at baseline.   Psychiatric:         Mood and Affect: Mood normal.         Behavior: Behavior normal.         Thought Content: Thought content normal.         Judgment: Judgment normal.     Vitals and nursing note reviewed. Physical exam within normal limits.     Assessment & Plan:    Neuro:  No diagnosis or significant findings on chart review or clinical presentation and evaluation.     HEENT/Airway:  No diagnosis or significant findings on chart review or clinical presentation and evaluation.   STOP-BANG Score    Mallampati::  IV    TM distance::  >3 FB    Neck ROM::  Full    Mouth opening 3  Has full upper and lower dentures    Cardiovascular:  Hypertension  Managed with amlodipine 5mg daily    Pure Hypertriglyceridemia  Managed with fenofibrate 48mg daily    METS: 7.25  RCRI: 0 points, 3.9%  risk for postoperative MACE   MK: 0.1% risk for postoperative MACE  EKG - sinus tachycardia rate of 101    Pulmonary:  No diagnosis or significant findings on chart review or clinical presentation and evaluation.   ARISCAT: <26 points, 1.6% risk of in-hospital postoperative pulmonary complication  PRODIGY: High risk for opioid induced respiratory depression  Smoking History-  Discussed smoking cessation and deep breathing  handout given    Renal:  No diagnosis or significant findings on chart review or clinical presentation and evaluation, however, the patient is at increased risk of perioperative renal complications secondary to male sex and HTN. Preventative measures include  CMP ordered     Endocrine:  No diagnosis or significant findings on chart review or clinical presentation and evaluation.     Hematologic:  CBC ordered   Caprini Score 2, patient at Low risk for postoperative DVT. Pt supplied education/VTE handout    Gastrointestinal:   GERD  Managed with omeprazole 40mg daily  Alcohol use-  Drug use-    Infectious disease:   No diagnosis or significant findings on chart review or clinical presentation and evaluation.   Chlorhexidine 0.12% mouthwash sent to the pharmacy for the patient  Staph screen collected by lab    Musculoskeletal:   No diagnosis or significant findings on chart review or clinical presentation and evaluation.   JHFRAT score    Anesthesia:  No anesthesia complications  Family history of anesthesia complications    Labs & Imaging ordered:  CBC, BMP  A1C 6.2 on 4/16/2024    Nickel/metal allergy-no  Shellfish allergy-no    Overall, patient at low risk for the scheduled surgery. Discussed with patient medication instructions, NPO guidelines, and any questions or concerns. Patient needs no further workup prior to preceding with elective surgery.     Face to face time-30 minutes    Geovanna Max APRN, CNP

## 2024-05-16 NOTE — H&P (VIEW-ONLY)
Patient is an alert and oriented 59 year old scheduled for a  Right Small PIP Silastic Arthroplasty  on 5/20/2024 with Dr. Manuel for  Isabella's Nodules.    The patient reports pressure and pain when he bumps his finger or tries to bend it.  He states his joint is swollen.  He states nothing helps the pain except not bending his finger and not bumping it.  He has had injections in the past.    PMHX includes Essential Hypertension, Pure Hypertriglyceridemia, GERD.      Presents to [facility] today for perioperative risk stratification and optimization.      Review of Systems   Constitutional: Negative for chills, decreased appetite, diaphoresis, fever and malaise/fatigue.   Eyes:  Negative for blurred vision and double vision.   Cardiovascular:  Negative for chest pain, claudication, cyanosis, dyspnea on exertion, irregular heartbeat, leg swelling, near-syncope and palpitations.   Respiratory:  Negative for cough, hemoptysis, shortness of breath and wheezing.    Endocrine: Negative for cold intolerance, heat intolerance, polydipsia, polyphagia and polyuria.   Gastrointestinal:  Negative for abdominal pain, constipation, diarrhea, dysphagia, nausea and vomiting.   Genitourinary:  Negative for bladder incontinence, dysuria, hematuria, incomplete emptying, nocturia, pelvic pain and urgency.   Neurological:  Negative for headaches, light-headedness, paresthesias, sensory change and weakness.   Psychiatric/Behavioral:  Negative for altered mental status.       Vitals and nursing note reviewed.     Physical exam  Constitutional:       Appearance: Normal appearance. He is normal weight.   HENT:      Head: Normocephalic and atraumatic.      Mouth/Throat:      Mouth: Mucous membranes are moist.      Pharynx: Oropharynx is clear.   Eyes:      Extraocular Movements: Extraocular movements intact.      Conjunctiva/sclera: Conjunctivae normal.      Pupils: Pupils are equal, round, and reactive to light.   Cardiovascular:      Rate  and Rhythm: Normal rate and regular rhythm.      Pulses: Normal pulses.      Heart sounds: Normal heart sounds.      No audible carotid bruit  Pulmonary:      Effort: Pulmonary effort is normal.      Breath sounds: Normal breath sounds.   Abdominal:      General: Abdomen is flat. Bowel sounds are normal.      Palpations: Abdomen is soft.   Musculoskeletal:      Cervical back: Normal range of motion and neck supple.   Swollen right 5th finger pip joint  Skin:     General: Skin is warm and dry.      Capillary Refill: Capillary refill takes less than 2 seconds.   Neurological:      General: No focal deficit present.      Mental Status: He is alert and oriented to person, place, and time. Mental status is at baseline.   Psychiatric:         Mood and Affect: Mood normal.         Behavior: Behavior normal.         Thought Content: Thought content normal.         Judgment: Judgment normal.     Vitals and nursing note reviewed. Physical exam within normal limits.     Assessment & Plan:    Neuro:  No diagnosis or significant findings on chart review or clinical presentation and evaluation.     HEENT/Airway:  No diagnosis or significant findings on chart review or clinical presentation and evaluation.   STOP-BANG Score    Mallampati::  IV    TM distance::  >3 FB    Neck ROM::  Full    Mouth opening 3  Has full upper and lower dentures    Cardiovascular:  Hypertension  Managed with amlodipine 5mg daily    Pure Hypertriglyceridemia  Managed with fenofibrate 48mg daily    METS: 7.25  RCRI: 0 points, 3.9%  risk for postoperative MACE   MK: 0.1% risk for postoperative MACE  EKG - sinus tachycardia rate of 101    Pulmonary:  No diagnosis or significant findings on chart review or clinical presentation and evaluation.   ARISCAT: <26 points, 1.6% risk of in-hospital postoperative pulmonary complication  PRODIGY: High risk for opioid induced respiratory depression  Smoking History-  Discussed smoking cessation and deep breathing  handout given    Renal:  No diagnosis or significant findings on chart review or clinical presentation and evaluation, however, the patient is at increased risk of perioperative renal complications secondary to male sex and HTN. Preventative measures include  CMP ordered     Endocrine:  No diagnosis or significant findings on chart review or clinical presentation and evaluation.     Hematologic:  CBC ordered   Caprini Score 2, patient at Low risk for postoperative DVT. Pt supplied education/VTE handout    Gastrointestinal:   GERD  Managed with omeprazole 40mg daily  Alcohol use-  Drug use-    Infectious disease:   No diagnosis or significant findings on chart review or clinical presentation and evaluation.   Chlorhexidine 0.12% mouthwash sent to the pharmacy for the patient  Staph screen collected by lab    Musculoskeletal:   No diagnosis or significant findings on chart review or clinical presentation and evaluation.   JHFRAT score    Anesthesia:  No anesthesia complications  Family history of anesthesia complications    Labs & Imaging ordered:  CBC, BMP  A1C 6.2 on 4/16/2024    Nickel/metal allergy-no  Shellfish allergy-no    Overall, patient at low risk for the scheduled surgery. Discussed with patient medication instructions, NPO guidelines, and any questions or concerns. Patient needs no further workup prior to preceding with elective surgery.     Face to face time-30 minutes    Geovanna Max APRN, CNP

## 2024-05-16 NOTE — PREPROCEDURE INSTRUCTIONS
Medication List            Accurate as of May 16, 2024  4:23 PM. Always use your most recent med list.                amLODIPine 5 mg tablet  Commonly known as: Norvasc  Medication Adjustments for Surgery: Take morning of surgery with sip of water, no other fluids     atorvastatin 20 mg tablet  Commonly known as: Lipitor  Medication Adjustments for Surgery: Continue until night before surgery     fenofibrate 48 mg tablet  Commonly known as: Tricor  Medication Adjustments for Surgery: Stop 1 day before surgery  Notes to patient: Last dose on 5/19/2024     multivitamin tablet  Medication Adjustments for Surgery: Stop 7 days before surgery  Notes to patient: Last dose on 5/16/2024     omeprazole OTC 20 mg EC tablet  Commonly known as: PriLOSEC OTC  Medication Adjustments for Surgery: Take morning of surgery with sip of water, no other fluids     sildenafil 100 mg tablet  Commonly known as: Viagra  Medication Adjustments for Surgery: Stop 3 days before surgery  Notes to patient: Last dose 5/16/2024     triamterene-hydrochlorothiazid 37.5-25 mg capsule  Commonly known as: Dyazide  Medication Adjustments for Surgery: Take morning of surgery with sip of water, no other fluids            NPO Instructions:    Do not eat any food after midnight the night before your surgery/procedure.  You may have clear liquids until TWO hours before surgery/procedure. This includes water, black tea/coffee, (no milk or cream) apple juice and electrolyte drinks (Gatorade).  You may chew gum up to TWO hours before your surgery/procedure.    Additional Instructions:     Seven/Six Days before Surgery:  Review your medication instructions, stop indicated medications  Five Days before Surgery:  Review your medication instructions, stop indicated medications  Three Days before Surgery:  Review your medication instructions, stop indicated medications  The Day before Surgery:  Review your medication instructions, stop indicated medications  You  will be contacted regarding the time of your arrival to facility and surgery time  Do not eat any food after Midnight  Day of Surgery:  Review your medication instructions, take indicated medications  You may have clear liquids until TWO hours before surgery/procedure.  This includes water, black tea/coffee, (no milk or cream) apple juice and electrolyte drinks (Gatorade)  You may chew gum up to TWO hours before your surgery/procedure  Wear  comfortable loose fitting clothing  Do not use moisturizers, creams, lotions or perfume  All jewelry and valuables should be left at home  CONTACT SURGEON'S OFFICE IF YOU DEVELOP:  * Fever = 100.4 F   * New respiratory symptoms (e.g. cough, shortness of breath, respiratory distress, sore throat)  * Recent loss of taste or smell  *Flu like symptoms such as headache, fatigue or gastrointestinal symptoms  * You develop any open sores, shingles, burning or painful urination   AND/OR:  * You no longer wish to have the surgery.  * Any other personal circumstances change that may lead to the need to cancel or defer this surgery.  *You were admitted to any hospital within one week of your planned procedure.    SMOKING:  *Quitting smoking can make a huge difference to your health and recovery from surgery.    *If you need help with quitting, call 6-846-QUIT-NOW.    THE DAY BEFORE SURGERY:  *Do not eat any food after midnight the night before your surgery.   *You may have up to 13.5 OUNCES of clear liquids until TWO hours before your instructed ARRIVAL TIME to hospital. This includes water, black tea/coffee, (no milk or cream) apple juice, clear broth and electrolyte drinks (Gatorade). Please avoid clear liquids that are red in color.   *You may chew gum/mints up to TWO hours before your surgery/procedure.    SURGICAL TIME:  *You will be contacted between 2 p.m. and 3 p.m. the business day before your surgery with your arrival time.  *If you haven't received a call by 3pm, call [phone]  [phone]  *Scheduled surgery times may change and you will be notified if this occurs-check your personal voicemail for any updates.    ON THE MORNING OF SURGERY:  *Wear comfortable, loose fitting clothing.   *Do not use moisturizers, creams, lotions or perfume.  *All jewelry and valuables should be left at home.  *Prosthetic devices such as contact lenses, hearing aids, dentures, eyelash extensions, hairpins and body piercing must be removed before surgery.    BRING WITH YOU:  *Photo ID and insurance card  *Current list of medications and allergies  *Pacemaker/Defibrillator/Heart stent cards  *CPAP machine and mask  *Slings/splints/crutches  *Copy of your complete Advanced Directive/DHPOA-if applicable  *Neurostimulator implant remote    PARKING AND ARRIVAL:  *Check in at the Main Entrance desk and let them know you are here for surgery.    IF YOU ARE HAVING OUTPATIENT/SAME DAY SURGERY:  *A responsible adult MUST accompany you at the time of discharge and stay with you for 24 hours after your surgery.  *You may NOT drive yourself home after surgery.  *You may use a taxi or ride sharing service ([employer], Uber) to return home ONLY if you are accompanied by a friend or family member.  *Instructions for resuming your medications will be provided by your surgeon.    Thank you for coming to Pre Admission testing.     If I have prescribe medication please don't forget to  at your pharmacy.     Any questions about today's visit call [phone] and leave a message in the general mailbox.    Patient instructed to ambulate as soon as possible postoperatively to decrease thromboembolic risk.    Geovanna Max, APRN-CNP    Thank you for visiting [facility].  If you have any changes to your health condition, please call the surgeons office to alert them and give them details of your symptoms.        Preoperative Fasting Guidelines    Why must I stop eating and drinking near surgery time?  With  sedation, food or liquid in your stomach can enter your lungs causing serious complications  Increases nausea and vomiting    When do I need to stop eating and drinking before my surgery?  Do not eat any food after midnight the night before your surgery/procedure.  You may have up to 13.5 ounces of clear liquid until TWO hours before your instructed arrival time to the hospital.  This includes water, black tea/coffee, (no milk or cream) apple juice, and electrolyte drinks (Gatorade)  You may chew gum until TWO hours before your surgery/procedure      Additional Instructions:     The Day before Surgery:  -Review your medication instructions, stop indicated medications  -You will be contacted in the evening regarding the time of your arrival to facility and surgery time    Day of Surgery:  -Review your medication instructions, take indicated medications  -Wear comfortable loose fitting clothing  -Do not use moisturizers, creams, lotions or perfume  -All jewelry and valuables should be left at home              Preoperative Brain Exercises    What are brain exercises?  A brain exercise is any activity that engages your thinking (cognitive) skills.    What types of activities are considered brain exercises?  Jigsaw puzzles, crossword puzzles, word jumble, memory games, word search, and many more.  Many can be found free online or on your phone via a mobile indira.    Why should I do brain exercises before my surgery?  More recent research has shown brain exercise before surgery can lower the risk of postoperative delirium (confusion) which can be especially important for older adults.  Patients who did brain exercises for 5 to 10 hours the days before surgery, cut their risk of postoperative delirium in half up to 1 week after surgery.                  [facility]    Preoperative Deep Breathing Exercises    Why it is important to do deep breathing exercises before my surgery?  Deep breathing  exercises strengthen your breathing muscles.  This helps you to recover after your surgery and decreases the chance of breathing complications.      How are the deep breathing exercises done?  Sit straight with your back supported.  Breathe in deeply and slowly through your nose. Your lower rib cage should expand and your abdomen may move forward.  Hold that breath for 3 to 5 seconds.  Breathe out through pursed lips, slowly and completely.  Rest and repeat 10 times every hour while awake.  Rest longer if you become dizzy or lightheaded.                [facility]    Preoperative Deep Breathing Exercises    Why it is important to do deep breathing exercises before my surgery?  Deep breathing exercises strengthen your breathing muscles.  This helps you to recover after your surgery and decreases the chance of breathing complications.      How are the deep breathing exercises done?  Sit straight with your back supported.  Breathe in deeply and slowly through your nose. Your lower rib cage should expand and your abdomen may move forward.  Hold that breath for 3 to 5 seconds.  Breathe out through pursed lips, slowly and completely.  Rest and repeat 10 times every hour while awake.  Rest longer if you become dizzy or lightheaded.      Patient Information: Incentive Spirometer  What is an incentive spirometer?  An incentive spirometer is a device used before and after surgery to “exercise” your lungs.  It helps you to take deeper breaths to expand your lungs.  Below is an example of a basic incentive spirometer.  The device you receive may differ slightly but they all function the same.    Why do I need to use an incentive spirometer?  Using your incentive spirometer prepares your lungs for surgery and helps prevent lung problems after surgery.  How do I use my incentive spirometer?  When you're using your incentive spirometer, make sure to breathe through your mouth. If you breathe through your  nose, the incentive spirometer won't work properly. You can hold your nose if you have trouble.  If you feel dizzy at any time, stop and rest. Try again at a later time.  Follow the steps below:  Set up your incentive spirometer, expand the flexible tubing and connect to the outlet.  Sit upright in a chair or bed. Hold the incentive spirometer at eye level.   Put the mouthpiece in your mouth and close your lips tightly around it. Slowly breathe out (exhale) completely.  Breathe in (inhale) slowly through your mouth as deeply as you can. As you take a breath, you will see the piston rise inside the large column. While the piston rises, the indicator should move upwards. It should stay in between the 2 arrows (see Figure).  Try to get the piston as high as you can, while keeping the indicator between the arrows.   If the indicator doesn't stay between the arrows, you're breathing either too fast or too slow.  When you get it as high as you can, hold your breath for 10 seconds, or as long as possible. While you're holding your breath, the piston will slowly fall to the base of the spirometer.  Once the piston reaches the bottom of the spirometer, breathe out slowly through your mouth. Rest for a few seconds.  Repeat 10 times. Try to get the piston to the same level with each breath.  Repeat every hour while awake  You can carefully clean the outside of the mouthpiece with an alcohol wipe or soap and water.      Patient and Family Education             Ways You Can Help Prevent Blood Clots             This handout explains some simple things you can do to help prevent blood clots.      Blood clots are blockages that can form in the body's veins. When a blood clot forms in your deep veins, it may be called a deep vein thrombosis, or DVT for short. Blood clots can happen in any part of the body where blood flows, but they are most common in the arms and legs. If a piece of a blood clot breaks free and travels to the  lungs, it is called a pulmonary embolus (PE). A PE can be a very serious problem.         Being in the hospital or having surgery can raise your chances of getting a blood clot because you may not be well enough to move around as much as you normally do.         Ways you can help prevent blood clots in the hospital         Wearing SCDs. SCDs stands for Sequential Compression Devices.   SCDs are special sleeves that wrap around your legs  They attach to a pump that fills them with air to gently squeeze your legs every few minutes.   This helps return the blood in your legs to your heart.   SCDs should only be taken off when walking or bathing.   SCDs may not be comfortable, but they can help save your life.               Wearing compression stockings - if your doctor orders them. These special snug fitting stockings gently squeeze your legs to help blood flow.       Walking. Walking helps move the blood in your legs.   If your doctor says it is ok, try walking the halls at least   5 times a day. Ask us to help you get up, so you don't fall.      Taking any blood thinning medicines your doctor orders.        Page 1 of 2     [facility]; 3/23   Ways you can help prevent blood clots at home       Wearing compression stockings - if your doctor orders them. ? Walking - to help move the blood in your legs.       Taking any blood thinning medicines your doctor orders.      Signs of a blood clot or PE      Tell your doctor or nurse know right away if you have of the problems listed below.    If you are at home, seek medical care right away. Call 911 for chest pain or problems breathing.               Signs of a blood clot (DVT) - such as pain,  swelling, redness or warmth in your arm or leg      Signs of a pulmonary embolism (PE) - such as chest     pain or feeling short of breath

## 2024-05-20 ENCOUNTER — ANESTHESIA (OUTPATIENT)
Dept: OPERATING ROOM | Facility: HOSPITAL | Age: 59
End: 2024-05-20
Payer: COMMERCIAL

## 2024-05-20 ENCOUNTER — HOSPITAL ENCOUNTER (OUTPATIENT)
Facility: HOSPITAL | Age: 59
Setting detail: OUTPATIENT SURGERY
Discharge: HOME | End: 2024-05-20
Attending: ORTHOPAEDIC SURGERY | Admitting: ORTHOPAEDIC SURGERY
Payer: COMMERCIAL

## 2024-05-20 ENCOUNTER — ANESTHESIA EVENT (OUTPATIENT)
Dept: OPERATING ROOM | Facility: HOSPITAL | Age: 59
End: 2024-05-20
Payer: COMMERCIAL

## 2024-05-20 VITALS
OXYGEN SATURATION: 96 % | BODY MASS INDEX: 28.55 KG/M2 | TEMPERATURE: 97.7 F | DIASTOLIC BLOOD PRESSURE: 89 MMHG | RESPIRATION RATE: 18 BRPM | HEIGHT: 72 IN | WEIGHT: 210.76 LBS | HEART RATE: 107 BPM | SYSTOLIC BLOOD PRESSURE: 130 MMHG

## 2024-05-20 DIAGNOSIS — G89.18 POST-OPERATIVE PAIN: Primary | ICD-10-CM

## 2024-05-20 DIAGNOSIS — M15.2 BOUCHARD'S NODES: ICD-10-CM

## 2024-05-20 LAB
ATRIAL RATE: 101 BPM
P AXIS: 40 DEGREES
P OFFSET: 186 MS
P ONSET: 135 MS
PR INTERVAL: 156 MS
Q ONSET: 213 MS
QRS COUNT: 17 BEATS
QRS DURATION: 82 MS
QT INTERVAL: 358 MS
QTC CALCULATION(BAZETT): 464 MS
QTC FREDERICIA: 426 MS
R AXIS: -3 DEGREES
T AXIS: 46 DEGREES
T OFFSET: 392 MS
VENTRICULAR RATE: 101 BPM

## 2024-05-20 PROCEDURE — A26536 PR ARTHROPLASTY I-P JT,IMPLANT: Performed by: STUDENT IN AN ORGANIZED HEALTH CARE EDUCATION/TRAINING PROGRAM

## 2024-05-20 PROCEDURE — 2720000007 HC OR 272 NO HCPCS: Performed by: ORTHOPAEDIC SURGERY

## 2024-05-20 PROCEDURE — 2500000005 HC RX 250 GENERAL PHARMACY W/O HCPCS: Performed by: ORTHOPAEDIC SURGERY

## 2024-05-20 PROCEDURE — 2500000005 HC RX 250 GENERAL PHARMACY W/O HCPCS: Performed by: STUDENT IN AN ORGANIZED HEALTH CARE EDUCATION/TRAINING PROGRAM

## 2024-05-20 PROCEDURE — 7100000002 HC RECOVERY ROOM TIME - EACH INCREMENTAL 1 MINUTE: Performed by: ORTHOPAEDIC SURGERY

## 2024-05-20 PROCEDURE — 3600000009 HC OR TIME - EACH INCREMENTAL 1 MINUTE - PROCEDURE LEVEL FOUR: Performed by: ORTHOPAEDIC SURGERY

## 2024-05-20 PROCEDURE — 2780000003 HC OR 278 NO HCPCS: Performed by: ORTHOPAEDIC SURGERY

## 2024-05-20 PROCEDURE — 26536 REVISE/IMPLANT FINGER JOINT: CPT | Performed by: ORTHOPAEDIC SURGERY

## 2024-05-20 PROCEDURE — 3600000004 HC OR TIME - INITIAL BASE CHARGE - PROCEDURE LEVEL FOUR: Performed by: ORTHOPAEDIC SURGERY

## 2024-05-20 PROCEDURE — 2500000004 HC RX 250 GENERAL PHARMACY W/ HCPCS (ALT 636 FOR OP/ED): Performed by: STUDENT IN AN ORGANIZED HEALTH CARE EDUCATION/TRAINING PROGRAM

## 2024-05-20 PROCEDURE — 7100000009 HC PHASE TWO TIME - INITIAL BASE CHARGE: Performed by: ORTHOPAEDIC SURGERY

## 2024-05-20 PROCEDURE — 7100000010 HC PHASE TWO TIME - EACH INCREMENTAL 1 MINUTE: Performed by: ORTHOPAEDIC SURGERY

## 2024-05-20 PROCEDURE — 3700000002 HC GENERAL ANESTHESIA TIME - EACH INCREMENTAL 1 MINUTE: Performed by: ORTHOPAEDIC SURGERY

## 2024-05-20 PROCEDURE — 3700000001 HC GENERAL ANESTHESIA TIME - INITIAL BASE CHARGE: Performed by: ORTHOPAEDIC SURGERY

## 2024-05-20 PROCEDURE — 7100000001 HC RECOVERY ROOM TIME - INITIAL BASE CHARGE: Performed by: ORTHOPAEDIC SURGERY

## 2024-05-20 DEVICE — IMPLANTABLE DEVICE: Type: IMPLANTABLE DEVICE | Site: LITTLE FINGER | Status: FUNCTIONAL

## 2024-05-20 RX ORDER — CEFAZOLIN 1 G/1
INJECTION, POWDER, FOR SOLUTION INTRAVENOUS AS NEEDED
Status: DISCONTINUED | OUTPATIENT
Start: 2024-05-20 | End: 2024-05-20

## 2024-05-20 RX ORDER — FENTANYL CITRATE 50 UG/ML
25 INJECTION, SOLUTION INTRAMUSCULAR; INTRAVENOUS EVERY 5 MIN PRN
Status: DISCONTINUED | OUTPATIENT
Start: 2024-05-20 | End: 2024-05-20 | Stop reason: HOSPADM

## 2024-05-20 RX ORDER — ALBUTEROL SULFATE 0.83 MG/ML
2.5 SOLUTION RESPIRATORY (INHALATION) ONCE
Status: DISCONTINUED | OUTPATIENT
Start: 2024-05-20 | End: 2024-05-20 | Stop reason: HOSPADM

## 2024-05-20 RX ORDER — KETOROLAC TROMETHAMINE 30 MG/ML
INJECTION, SOLUTION INTRAMUSCULAR; INTRAVENOUS AS NEEDED
Status: DISCONTINUED | OUTPATIENT
Start: 2024-05-20 | End: 2024-05-20

## 2024-05-20 RX ORDER — OXYCODONE HYDROCHLORIDE 5 MG/1
10 TABLET ORAL EVERY 4 HOURS PRN
Status: DISCONTINUED | OUTPATIENT
Start: 2024-05-20 | End: 2024-05-20 | Stop reason: HOSPADM

## 2024-05-20 RX ORDER — CEFAZOLIN SODIUM 2 G/100ML
2 INJECTION, SOLUTION INTRAVENOUS ONCE
Status: DISCONTINUED | OUTPATIENT
Start: 2024-05-20 | End: 2024-05-20 | Stop reason: HOSPADM

## 2024-05-20 RX ORDER — OXYCODONE HYDROCHLORIDE 5 MG/1
5 TABLET ORAL EVERY 4 HOURS PRN
Status: DISCONTINUED | OUTPATIENT
Start: 2024-05-20 | End: 2024-05-20 | Stop reason: HOSPADM

## 2024-05-20 RX ORDER — PROPOFOL 10 MG/ML
INJECTION, EMULSION INTRAVENOUS AS NEEDED
Status: DISCONTINUED | OUTPATIENT
Start: 2024-05-20 | End: 2024-05-20

## 2024-05-20 RX ORDER — BUPIVACAINE HYDROCHLORIDE 5 MG/ML
INJECTION, SOLUTION PERINEURAL AS NEEDED
Status: DISCONTINUED | OUTPATIENT
Start: 2024-05-20 | End: 2024-05-20 | Stop reason: HOSPADM

## 2024-05-20 RX ORDER — FENTANYL CITRATE 50 UG/ML
50 INJECTION, SOLUTION INTRAMUSCULAR; INTRAVENOUS EVERY 5 MIN PRN
Status: DISCONTINUED | OUTPATIENT
Start: 2024-05-20 | End: 2024-05-20 | Stop reason: HOSPADM

## 2024-05-20 RX ORDER — ACETAMINOPHEN 325 MG/1
650 TABLET ORAL EVERY 4 HOURS PRN
Status: DISCONTINUED | OUTPATIENT
Start: 2024-05-20 | End: 2024-05-20 | Stop reason: HOSPADM

## 2024-05-20 RX ORDER — SODIUM CHLORIDE, SODIUM LACTATE, POTASSIUM CHLORIDE, CALCIUM CHLORIDE 600; 310; 30; 20 MG/100ML; MG/100ML; MG/100ML; MG/100ML
100 INJECTION, SOLUTION INTRAVENOUS CONTINUOUS
Status: DISCONTINUED | OUTPATIENT
Start: 2024-05-20 | End: 2024-05-20 | Stop reason: HOSPADM

## 2024-05-20 RX ORDER — ONDANSETRON HYDROCHLORIDE 2 MG/ML
4 INJECTION, SOLUTION INTRAVENOUS ONCE AS NEEDED
Status: DISCONTINUED | OUTPATIENT
Start: 2024-05-20 | End: 2024-05-20 | Stop reason: HOSPADM

## 2024-05-20 RX ORDER — FENTANYL CITRATE 50 UG/ML
INJECTION, SOLUTION INTRAMUSCULAR; INTRAVENOUS AS NEEDED
Status: DISCONTINUED | OUTPATIENT
Start: 2024-05-20 | End: 2024-05-20

## 2024-05-20 RX ORDER — ONDANSETRON HYDROCHLORIDE 2 MG/ML
INJECTION, SOLUTION INTRAVENOUS AS NEEDED
Status: DISCONTINUED | OUTPATIENT
Start: 2024-05-20 | End: 2024-05-20

## 2024-05-20 RX ORDER — MIDAZOLAM HYDROCHLORIDE 1 MG/ML
INJECTION, SOLUTION INTRAMUSCULAR; INTRAVENOUS AS NEEDED
Status: DISCONTINUED | OUTPATIENT
Start: 2024-05-20 | End: 2024-05-20

## 2024-05-20 RX ORDER — LIDOCAINE HYDROCHLORIDE 10 MG/ML
INJECTION, SOLUTION EPIDURAL; INFILTRATION; INTRACAUDAL; PERINEURAL AS NEEDED
Status: DISCONTINUED | OUTPATIENT
Start: 2024-05-20 | End: 2024-05-20

## 2024-05-20 RX ORDER — LIDOCAINE HYDROCHLORIDE 10 MG/ML
INJECTION INFILTRATION; PERINEURAL AS NEEDED
Status: DISCONTINUED | OUTPATIENT
Start: 2024-05-20 | End: 2024-05-20 | Stop reason: HOSPADM

## 2024-05-20 RX ORDER — HYDROCODONE BITARTRATE AND ACETAMINOPHEN 5; 325 MG/1; MG/1
1 TABLET ORAL EVERY 6 HOURS PRN
Qty: 20 TABLET | Refills: 0 | Status: SHIPPED | OUTPATIENT
Start: 2024-05-20

## 2024-05-20 RX ADMIN — LIDOCAINE HYDROCHLORIDE 5 ML: 10 INJECTION, SOLUTION EPIDURAL; INFILTRATION; INTRACAUDAL; PERINEURAL at 08:04

## 2024-05-20 RX ADMIN — SODIUM CHLORIDE, SODIUM LACTATE, POTASSIUM CHLORIDE, AND CALCIUM CHLORIDE: 600; 310; 30; 20 INJECTION, SOLUTION INTRAVENOUS at 08:01

## 2024-05-20 RX ADMIN — MIDAZOLAM 2 MG: 1 INJECTION INTRAMUSCULAR; INTRAVENOUS at 08:01

## 2024-05-20 RX ADMIN — DEXAMETHASONE SODIUM PHOSPHATE 4 MG: 4 INJECTION, SOLUTION INTRAMUSCULAR; INTRAVENOUS at 08:07

## 2024-05-20 RX ADMIN — PROPOFOL 200 MG: 10 INJECTION, EMULSION INTRAVENOUS at 08:04

## 2024-05-20 RX ADMIN — FENTANYL CITRATE 50 MCG: 50 INJECTION INTRAMUSCULAR; INTRAVENOUS at 08:11

## 2024-05-20 RX ADMIN — KETOROLAC TROMETHAMINE 30 MG: 30 INJECTION, SOLUTION INTRAMUSCULAR at 09:03

## 2024-05-20 RX ADMIN — CEFAZOLIN 2 G: 330 INJECTION, POWDER, FOR SOLUTION INTRAMUSCULAR; INTRAVENOUS at 08:07

## 2024-05-20 RX ADMIN — ONDANSETRON 4 MG: 2 INJECTION INTRAMUSCULAR; INTRAVENOUS at 09:03

## 2024-05-20 RX ADMIN — FENTANYL CITRATE 50 MCG: 50 INJECTION INTRAMUSCULAR; INTRAVENOUS at 08:04

## 2024-05-20 ASSESSMENT — PAIN - FUNCTIONAL ASSESSMENT
PAIN_FUNCTIONAL_ASSESSMENT: FLACC (FACE, LEGS, ACTIVITY, CRY, CONSOLABILITY)
PAIN_FUNCTIONAL_ASSESSMENT: 0-10
PAIN_FUNCTIONAL_ASSESSMENT: 0-10
PAIN_FUNCTIONAL_ASSESSMENT: FLACC (FACE, LEGS, ACTIVITY, CRY, CONSOLABILITY)
PAIN_FUNCTIONAL_ASSESSMENT: 0-10
PAIN_FUNCTIONAL_ASSESSMENT: 0-10

## 2024-05-20 ASSESSMENT — PAIN SCALES - GENERAL
PAINLEVEL_OUTOF10: 0 - NO PAIN
PAINLEVEL_OUTOF10: 10 - WORST POSSIBLE PAIN
PAINLEVEL_OUTOF10: 0 - NO PAIN
PAINLEVEL_OUTOF10: 0 - NO PAIN

## 2024-05-20 ASSESSMENT — COLUMBIA-SUICIDE SEVERITY RATING SCALE - C-SSRS
2. HAVE YOU ACTUALLY HAD ANY THOUGHTS OF KILLING YOURSELF?: NO
1. IN THE PAST MONTH, HAVE YOU WISHED YOU WERE DEAD OR WISHED YOU COULD GO TO SLEEP AND NOT WAKE UP?: NO
6. HAVE YOU EVER DONE ANYTHING, STARTED TO DO ANYTHING, OR PREPARED TO DO ANYTHING TO END YOUR LIFE?: NO

## 2024-05-20 NOTE — ANESTHESIA POSTPROCEDURE EVALUATION
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])    Surgeons: Mike Manuel MD Responsible Provider: Ana Mckeon MD    Anesthesia Type: general ASA Status: 3            Anesthesia Type: general    Vitals Value Taken Time   /74 05/20/24 0927   Temp 36.7 °C (98.1 °F) 05/20/24 0927   Pulse 104 05/20/24 0927   Resp 16 05/20/24 0927   SpO2 99 % 05/20/24 0927       Anesthesia Post Evaluation    Patient location during evaluation: bedside  Patient participation: complete - patient participated  Level of consciousness: awake and alert  Pain management: adequate  Multimodal analgesia pain management approach  Airway patency: patent  Cardiovascular status: acceptable  Respiratory status: acceptable  Hydration status: acceptable  Postoperative Nausea and Vomiting: none        There were no known notable events for this encounter.

## 2024-05-20 NOTE — ANESTHESIA PREPROCEDURE EVALUATION
Patient: Yakov Mendes    Procedure Information       Date/Time: 05/20/24 0815    Procedure: Right Small PIP Silastic Arthroplasty (INTEGRA SILASTIC ARTHROPLASTY) (Right: Hand)    Location: [facility] / [facility]    Surgeons: Mike Manuel MD          Past Medical History:   Diagnosis Date    GERD (gastroesophageal reflux disease)     Hypertension     Personal history of other diseases of the circulatory system     History of hypertension    Personal history of other endocrine, nutritional and metabolic disease     History of hyperlipidemia     Past Surgical History:   Procedure Laterality Date    CT ANGIO NECK  12/4/2018    CT NECK ANGIO W AND WO IV CONTRAST 12/4/2018 [facility] [place] [facility]    OTHER SURGICAL HISTORY  03/11/2021    Full thickness skin graft     No Known Allergies    Relevant Problems   Anesthesia (within normal limits)      Cardiac (within normal limits)      Pulmonary (within normal limits)      Neuro (within normal limits)      GI (within normal limits)      /Renal (within normal limits)      Liver (within normal limits)      Endocrine (within normal limits)      Hematology (within normal limits)      Musculoskeletal   (+) 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      HEENT (within normal limits)      ID (within normal limits)      Skin (within normal limits)      GYN (within normal limits)       Clinical information reviewed:   Tobacco  Allergies  Meds   Med Hx  Surg Hx   Fam Hx  Soc Hx        NPO Detail:  NPO/Void Status  Date of Last Liquid: 05/19/24  Time of Last Liquid: 2300  Date of Last Solid: 05/19/24  Time of Last Solid: 2300  Time of Last Void: 2359         Physical Exam    Airway  Mallampati: II  TM distance: >3 FB  Neck ROM: full     Cardiovascular   Rhythm: regular  Rate: normal     Dental   (+) upper dentures, lower dentures     Pulmonary   Breath sounds clear to auscultation     Abdominal            Anesthesia  Plan    History of general anesthesia?: yes  History of complications of general anesthesia?: no    ASA 3     general     intravenous induction   Postoperative administration of opioids is intended.  Anesthetic plan and risks discussed with patient.  Use of blood products discussed with patient who.

## 2024-05-20 NOTE — DISCHARGE INSTRUCTIONS
Follow-Up Instructions    You will need to be seen in clinic in 10-15 days for a post-operative evaluation.  This appointment will be in the outpatient office, not at the Surgery Center.    You will need to call Sandra in my office and schedule an appointment, unless there is a previous appointment that appears on your discharge instructions.  Her phone number is [phone].  Please do not delay in calling to make this appointment.      Activity Restrictions    1)  No driving for 24 hours after surgery, due to the anesthetic.    2)  No driving or operating heavy machinery while taking narcotic pain medication.    3)  Weight bearing as tolerated.  Light use of the fingers (writing, typing, texting) is good to do.     Discharge Medications    A prescription for a narcotic pain medication (Norco) has been sent to your pharmacy of record.  I do not expect you will need this, but wanted you to have it available as an option if over-the-counter medications are not adequately controlling your pain.  Most people simply take Tylenol, Motrin, Advil, or other anti-inflammatory for the pain.  If you do end up taking the prescription medication, please try to wean yourself off this as quickly as possible.    You can add the prescription medication to the anti-inflammatories if needed, but should not add it to Tylenol, as there is already Tylenol in the prescription.    Wound care instructions:     1)  Leave operative dressing in place for 7 days.  If you shower, cover the hand with a plastic bag and elevate it so the water cannot run down into the bag.    2)  After 7 days, remove the bandage and leave the incision open to air, or cover with a simple Band-Aid.   At that point, you may let water run freely over incision when showering.  Do not scrub.  Do not soak in pool or tub, or submerge the incision until you are fully 21 days from surgery.    3)  Call if any drainage after 7 days, increased redness/warmth/swelling at  incision site, abnormal pain/tenderness of the extremity, abnormal swelling of the extremity that does not respond to elevation, shortness of breath, or chest pain.

## 2024-05-20 NOTE — ANESTHESIA PROCEDURE NOTES
Airway  Date/Time: 5/20/2024 8:06 AM  Urgency: elective      Staffing  Performed: attending   Authorized by: Ana Mckeon MD    Performed by: Ana Mckeon MD  Patient location during procedure: OR    Indications and Patient Condition  Indications for airway management: anesthesia  Spontaneous Ventilation: absent  Sedation level: deep  Preoxygenated: yes  Patient position: sniffing  Mask difficulty assessment: 1 - vent by mask    Final Airway Details  Final airway type: supraglottic airway      Successful airway: classic  Size 4     Number of attempts at approach: 1

## 2024-05-22 ASSESSMENT — PAIN SCALES - GENERAL: PAINLEVEL_OUTOF10: 2

## 2024-05-31 ENCOUNTER — OFFICE VISIT (OUTPATIENT)
Dept: ORTHOPEDIC SURGERY | Facility: HOSPITAL | Age: 59
End: 2024-05-31
Payer: COMMERCIAL

## 2024-05-31 DIAGNOSIS — M15.2 DEGENERATIVE ARTHRITIS OF PROXIMAL INTERPHALANGEAL JOINT OF LITTLE FINGER OF RIGHT HAND: ICD-10-CM

## 2024-05-31 PROCEDURE — 99024 POSTOP FOLLOW-UP VISIT: CPT | Performed by: ORTHOPAEDIC SURGERY

## 2024-05-31 NOTE — LETTER
Betty 3, 2024       No Recipients    Patient: Yakov Mendes   YOB: 1965   Date of Visit: 5/31/2024       Dear Dr. oHyt Recipients:    Thank you for referring Yakov Mendes to me for evaluation. Below are my notes for this consultation.  If you have questions, please do not hesitate to call me. I look forward to following your patient along with you.       Sincerely,     Mike Manuel MD      CC:   No Recipients  ______________________________________________________________________________________    CHIEF COMPLAINT         Right small finger f/u    ASSESSMENT + PLAN    Postop day 11 from right small finger PIP Silastic arthroplasty    The incision is healing normally.  The sutures are absorbable.  You may work aggressively on range of motion of the digit, working on the stretching exercises that I demonstrated today.  There are no particular limitations on function.  This may get wet, but should not be submerged for 1 more week.  Contact my office if you would like a formal Occupational Therapy referral.    Follow-up in 2 to 3 weeks for clinical check, or certainly sooner with any interval concerns.        HISTORY OF PRESENT ILLNESS       Patient returns today, postop day 11 from right small finger PIP Silastic arthroplasty from a volar approach.  He reports a little tingling along the ulnar border of the digit but otherwise his postoperative discomfort is resolving appropriately.  No other new concerns.  He has not been working on range of motion.      PHYSICAL EXAM       Postop dressing removed.  Incision clean, dry, intact with absorbable suture in place.  Excellent maintenance of full composite extension.  He has just a 2 cm arc of motion through the IP joints today.  Clinical alignment is quite good.  He is pleased with the correction of the previous deformity.  Sensation intact to light touch is a little altered in the ulnar portion of the digit.  Flap tips are all viable with capillary  refill less than 2 seconds.  2+ radial and ulnar pulses.  Symmetric wrist and forearm motion.      IMAGING / LABS / EMGs    None today      Electronically Signed      CHENG Manuel MD      Orthopaedic Hand Surgery      [phone]

## 2024-05-31 NOTE — PROGRESS NOTES
CHIEF COMPLAINT         Right small finger f/u    ASSESSMENT + PLAN    Postop day 11 from right small finger PIP Silastic arthroplasty    The incision is healing normally.  The sutures are absorbable.  You may work aggressively on range of motion of the digit, working on the stretching exercises that I demonstrated today.  There are no particular limitations on function.  This may get wet, but should not be submerged for 1 more week.  Contact my office if you would like a formal Occupational Therapy referral.    Follow-up in 2 to 3 weeks for clinical check, or certainly sooner with any interval concerns.        HISTORY OF PRESENT ILLNESS       Patient returns today, postop day 11 from right small finger PIP Silastic arthroplasty from a volar approach.  He reports a little tingling along the ulnar border of the digit but otherwise his postoperative discomfort is resolving appropriately.  No other new concerns.  He has not been working on range of motion.      PHYSICAL EXAM       Postop dressing removed.  Incision clean, dry, intact with absorbable suture in place.  Excellent maintenance of full composite extension.  He has just a 2 cm arc of motion through the IP joints today.  Clinical alignment is quite good.  He is pleased with the correction of the previous deformity.  Sensation intact to light touch is a little altered in the ulnar portion of the digit.  Flap tips are all viable with capillary refill less than 2 seconds.  2+ radial and ulnar pulses.  Symmetric wrist and forearm motion.      IMAGING / LABS / EMGs    None today      Electronically Signed      CHENG Manuel MD      Orthopaedic Hand Surgery      [phone]

## 2024-06-13 ENCOUNTER — EVALUATION (OUTPATIENT)
Dept: OCCUPATIONAL THERAPY | Facility: CLINIC | Age: 59
End: 2024-06-13
Payer: COMMERCIAL

## 2024-06-13 DIAGNOSIS — R29.898 DECREASED GRIP STRENGTH OF RIGHT HAND: ICD-10-CM

## 2024-06-13 DIAGNOSIS — M25.641 DECREASED RANGE OF MOTION OF FINGER OF RIGHT HAND: Primary | ICD-10-CM

## 2024-06-13 DIAGNOSIS — M15.2 DEGENERATIVE ARTHRITIS OF PROXIMAL INTERPHALANGEAL JOINT OF LITTLE FINGER OF RIGHT HAND: ICD-10-CM

## 2024-06-13 PROCEDURE — 97110 THERAPEUTIC EXERCISES: CPT | Mod: GO

## 2024-06-13 PROCEDURE — 97165 OT EVAL LOW COMPLEX 30 MIN: CPT | Mod: GO

## 2024-06-13 ASSESSMENT — ENCOUNTER SYMPTOMS
DEPRESSION: 0
OCCASIONAL FEELINGS OF UNSTEADINESS: 0
LOSS OF SENSATION IN FEET: 0

## 2024-06-13 ASSESSMENT — PAIN SCALES - GENERAL: PAINLEVEL_OUTOF10: 2

## 2024-06-13 ASSESSMENT — PAIN - FUNCTIONAL ASSESSMENT: PAIN_FUNCTIONAL_ASSESSMENT: 0-10

## 2024-06-13 NOTE — PROGRESS NOTES
Occupational Therapy    Occupational Therapy Evaluation    Name: Yakov Mendes  MRN: [id number]  : 1965  Date: 24  Time Calculation  Start Time: 330  Stop Time: 415  Time Calculation (min): 45 min  OT Evaluation Time Entry  OT Evaluation (Low) Time Entry: 30  OT Therapeutic Procedures Time Entry  Therapeutic Exercise Time Entry: 15                  Visit: 1  Ortiz  NO COPAY, 30 VISITS   Assessment:   Patient is a 59 year old man s/p Right Small PIP Silastic Arthroplasty on 24. Patient presents with R 5th digit edema, decreased ROM and strength impacting patient's abilities to carry out every day tasks. Patient would benefit from skilled OT to address the above impairments to maximize patient's functional abilities.   Plan:   OT POC to include: heat/ice, ultrasound, manual therapy, therapeutic exercise, therapeutic activity, HEP    2x a week for 4-6 weeks  Rehab potential: Good  Patient in agreement with plan     Subjective  Patient agreeable to OT evaluation. Patient reports difficulty with daily activities including cooking and bathing using the R hand.    Patient reports has his follow up with Dr. Manuel tomorrow 24.    Current Problem:  1. Decreased range of motion of finger of right hand  Follow Up In Occupational Therapy      2. 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  Referral to Occupational Therapy    Follow Up In Occupational Therapy      3. Decreased  strength of right hand  Follow Up In Occupational Therapy        General:      General  Reason for Referral: OT eval and treat  Referred By:  (Mike Manuel MD)  Preferred Learning Style: visual, written  Per Dr. Manuel's note on 24:  DOS: 24, Right Small PIP Silastic Arthroplasty   “Postop day 11 from right small finger PIP Silastic arthroplasty  The incision is healing normally.  The sutures are absorbable.  You may work aggressively on range of motion of the  "digit, working on the stretching exercises that I demonstrated today.  There are no particular limitations on function.  This may get wet, but should not be submerged for 1 more week.  Contact my office if you would like a formal Occupational Therapy referral.”     Precautions:  Precautions  STEADI Fall Risk Score (The score of 4 or more indicates an increased risk of falling): 0  Vital Signs:   Not taken   Pain Assessment:  Pain Assessment  Pain Assessment: 0-10  Pain Score: 2 tightness in the R pinky  Patient reports the pain can get up to a 6/10 shooting pain out of no where  Work History:  Retired  Prior Level of Function:  Independent  Roles/Routines:  Retired, likes to go to the bar and watch sports with friends  Patient Goal:  \"To use my finger\"  Personal Factors that my impact Care:   N/A  Objective   Observation:  R pinky with edema, laterally deviated   L handed  Palpation:  Not TTP  ROM:  5.5 cm in circumference of 5th digit PIP joint  R 5th digit 5cm away from palmar crease  R 5th digit:  MCP: 90 degrees  PIP: 15 degrees  DIP: 40 degrees   Strength:  R : 30#  L : 90#  Coordination:  Able to complete digit opposition  Sensation:   Reports numbness in the R 5th digit  Outcome Measures:  OT Adult Other Outcome Measures  Other Outcome Measures: Quick Dash: (9.09%)    OP EDUCATION:/Treatment   Patient educated on modalities (heat vs ice), educated on retrograde massage to the 5th digit and coban for edema management, completed ROM exercises to each joint of R 5th digit separately, and compositely.   Educated on continuing for home program.    Goals:  Active       OT Goals       HEP       Start:  06/13/24    Expected End:  08/22/24       Patient will be independent with established HEP         ROM       Start:  06/13/24    Expected End:  08/22/24       Patient will demonstrate increased 5th digit ROM as evident by being able to touch palmar crease         Strength        Start:  06/13/24    Expected " End:  08/22/24       Patient will increase R  strength by 10-15# for ADL/IADL tasks

## 2024-06-14 ENCOUNTER — OFFICE VISIT (OUTPATIENT)
Dept: ORTHOPEDIC SURGERY | Facility: HOSPITAL | Age: 59
End: 2024-06-14
Payer: COMMERCIAL

## 2024-06-14 DIAGNOSIS — M15.2 DEGENERATIVE ARTHRITIS OF PROXIMAL INTERPHALANGEAL JOINT OF LITTLE FINGER OF RIGHT HAND: Primary | ICD-10-CM

## 2024-06-14 DIAGNOSIS — M25.641 DECREASED RANGE OF MOTION OF FINGER OF RIGHT HAND: ICD-10-CM

## 2024-06-14 PROCEDURE — 99024 POSTOP FOLLOW-UP VISIT: CPT | Performed by: ORTHOPAEDIC SURGERY

## 2024-06-14 NOTE — PROGRESS NOTES
CHIEF COMPLAINT         Right small finger postop f/u    ASSESSMENT + PLAN    Postop day 25 from right small finger PIP Silastic arthroplasty    Incision has healed.  The range of motion is improving, but still is not as good as can be.  Keep working on the stretching exercises both on your own and with the therapist.  Advance activity as symptoms allow, with no particular restrictions.    Follow-up with me in 3 or 4 weeks for clinical check, or certainly sooner with any interval concerns.        HISTORY OF PRESENT ILLNESS       Patient returns today, as directed, 2 weeks after last visit and now postop day 25 from right small finger PIP Silastic arthroplasty.  He got in for his first OT session yesterday and has been working on range of motion.  He reports decreasing pain though the sensation remains a little altered along the radial aspect.  No new concerns.  No interval trauma.      PHYSICAL EXAM       Incision is clean, dry, intact and healing normally.  Flap tips and fingertip are viable with capillary refill less than 2 seconds.  Ulnar sensation is normal.  Radial sensation is altered from about 2 mm proximal to the DIP crease out.  This is a little smaller area than at the last visit.  MP has full 0 to 90 degree active motion.  PIP active motion is just 10 to 25 degrees but passively I can bring him down 40, limited more by pain than by mechanical restriction.  Radial-ulnar angulation was corrected and remained stable.  Symmetric wrist and forearm motion.      IMAGING / LABS / EMGs    None today      Electronically Signed      CHENG Manuel MD      Orthopaedic Hand Surgery      [phone]

## 2024-06-14 NOTE — LETTER
June 29, 2024       No Recipients    Patient: Yakov Mendes   YOB: 1965   Date of Visit: 6/14/2024       Dear Dr. Hoyt Recipients:    Thank you for referring Yakov Mendes to me for evaluation. Below are my notes for this consultation.  If you have questions, please do not hesitate to call me. I look forward to following your patient along with you.       Sincerely,     Mike Manuel MD      CC:   No Recipients  ______________________________________________________________________________________    CHIEF COMPLAINT         Right small finger postop f/u    ASSESSMENT + PLAN    Postop day 25 from right small finger PIP Silastic arthroplasty    Incision has healed.  The range of motion is improving, but still is not as good as can be.  Keep working on the stretching exercises both on your own and with the therapist.  Advance activity as symptoms allow, with no particular restrictions.    Follow-up with me in 3 or 4 weeks for clinical check, or certainly sooner with any interval concerns.        HISTORY OF PRESENT ILLNESS       Patient returns today, as directed, 2 weeks after last visit and now postop day 25 from right small finger PIP Silastic arthroplasty.  He got in for his first OT session yesterday and has been working on range of motion.  He reports decreasing pain though the sensation remains a little altered along the radial aspect.  No new concerns.  No interval trauma.      PHYSICAL EXAM       Incision is clean, dry, intact and healing normally.  Flap tips and fingertip are viable with capillary refill less than 2 seconds.  Ulnar sensation is normal.  Radial sensation is altered from about 2 mm proximal to the DIP crease out.  This is a little smaller area than at the last visit.  MP has full 0 to 90 degree active motion.  PIP active motion is just 10 to 25 degrees but passively I can bring him down 40, limited more by pain than by mechanical restriction.  Radial-ulnar angulation was  corrected and remained stable.  Symmetric wrist and forearm motion.      IMAGING / LABS / EMGs    None today      Electronically Signed      CHENG Manuel MD      Orthopaedic Hand Surgery      [phone]

## 2024-06-20 ENCOUNTER — TREATMENT (OUTPATIENT)
Dept: OCCUPATIONAL THERAPY | Facility: CLINIC | Age: 59
End: 2024-06-20
Payer: COMMERCIAL

## 2024-06-20 DIAGNOSIS — M15.2 DEGENERATIVE ARTHRITIS OF PROXIMAL INTERPHALANGEAL JOINT OF LITTLE FINGER OF RIGHT HAND: ICD-10-CM

## 2024-06-20 DIAGNOSIS — M25.641 DECREASED RANGE OF MOTION OF FINGER OF RIGHT HAND: Primary | ICD-10-CM

## 2024-06-20 DIAGNOSIS — R29.898 DECREASED GRIP STRENGTH OF RIGHT HAND: ICD-10-CM

## 2024-06-20 PROCEDURE — 97110 THERAPEUTIC EXERCISES: CPT | Mod: GO

## 2024-06-20 PROCEDURE — 97035 APP MDLTY 1+ULTRASOUND EA 15: CPT | Mod: GO

## 2024-06-20 PROCEDURE — 97140 MANUAL THERAPY 1/> REGIONS: CPT | Mod: GO

## 2024-06-20 ASSESSMENT — PAIN - FUNCTIONAL ASSESSMENT: PAIN_FUNCTIONAL_ASSESSMENT: 0-10

## 2024-06-20 ASSESSMENT — PAIN SCALES - GENERAL: PAINLEVEL_OUTOF10: 0 - NO PAIN

## 2024-06-20 NOTE — PROGRESS NOTES
Occupational Therapy    Occupational Therapy Treatment    Name: Yakov Mendes  MRN: [id number]  : 1965  Date: 24  Time Calculation  Start Time: 0100  Stop Time: 0145  Time Calculation (min): 45 min     OT Therapeutic Procedures Time Entry  Manual Therapy Time Entry: 20  Therapeutic Exercise Time Entry: 10  OT Modalities Time Entry  Ultrasound Time Entry: 8               Visit: 2  Ortiz  NO COPAY, 30 VISITS   Primary dx: Decreased ROM of R finger  Assessment:   Patient responded well to treatment session. Demonstrated good carryover of new exercises.    Plan:   Continue with OT POC with focus on R 5th digit mobility .    Subjective   Patient agreeable to treatment session.  Patient reports has been doing his stretching at home.  Pain Assessment:  Pain Assessment  Pain Assessment: 0-10  0-10 (Numeric) Pain Score: 0 - No pain    Objective    Unbillable time 5 mins:  Heat applied to R hand before exercises. Skin checks before and after application.     Manual Therapy: 20 mins  STM to volar and dorsal aspect of the R 5th digit  Retrograde massage to R 5th digit  Passive stretch to the R 5th digit at each joint separately, then compositely     Therapeutic Exercise: 10 mins  Completed yellow putty exercises to the R hand for strengthening: digit flexion, digit extension, digit adduction, tip to tip pinch, lateral pinch, digit abduction. Cues given for proper technique.      Utrasound 8 mins:  x8min continuous 3MHZ @1.0w/cm2 over volar and dorasl aspect of R 5th digit    OP EDUCATION:   Yellow putty exercises for R hand strengthening- given in handout form    Goals:  Active       OT Goals       HEP       Start:  24    Expected End:  24       Patient will be independent with established HEP         ROM       Start:  24    Expected End:  24       Patient will demonstrate increased 5th digit ROM as evident by being able to touch palmar crease         Strength        Start:  24     Expected End:  08/22/24       Patient will increase R  strength by 10-15# for ADL/IADL tasks

## 2024-07-08 ENCOUNTER — TREATMENT (OUTPATIENT)
Dept: OCCUPATIONAL THERAPY | Facility: CLINIC | Age: 59
End: 2024-07-08
Payer: COMMERCIAL

## 2024-07-08 DIAGNOSIS — M15.2 DEGENERATIVE ARTHRITIS OF PROXIMAL INTERPHALANGEAL JOINT OF LITTLE FINGER OF RIGHT HAND: ICD-10-CM

## 2024-07-08 DIAGNOSIS — R29.898 DECREASED GRIP STRENGTH OF RIGHT HAND: ICD-10-CM

## 2024-07-08 DIAGNOSIS — M25.641 DECREASED RANGE OF MOTION OF FINGER OF RIGHT HAND: Primary | ICD-10-CM

## 2024-07-08 PROCEDURE — 97035 APP MDLTY 1+ULTRASOUND EA 15: CPT | Mod: GO

## 2024-07-08 PROCEDURE — 97140 MANUAL THERAPY 1/> REGIONS: CPT | Mod: GO

## 2024-07-08 PROCEDURE — 97110 THERAPEUTIC EXERCISES: CPT | Mod: GO

## 2024-07-08 ASSESSMENT — PAIN SCALES - GENERAL: PAINLEVEL_OUTOF10: 0 - NO PAIN

## 2024-07-08 ASSESSMENT — PAIN - FUNCTIONAL ASSESSMENT: PAIN_FUNCTIONAL_ASSESSMENT: 0-10

## 2024-07-08 NOTE — PROGRESS NOTES
Occupational Therapy    Occupational Therapy Treatment    Name: Yakov Mnedes  MRN: [id number]  : 1965  Date: 24  Time Calculation  Start Time: 345  Stop Time: 429  Time Calculation (min): 44 min     OT Therapeutic Procedures Time Entry  Manual Therapy Time Entry: 15  Therapeutic Exercise Time Entry: 15  OT Modalities Time Entry  Ultrasound Time Entry: 8               Visit: 3  Ortiz  NO COPAY, 30 VISITS   Primary dx: Decreased ROM of R finger  Problem List Items Addressed This Visit             ICD-10-CM    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 M15.2    Decreased range of motion of finger of right hand - Primary M25.641    Decreased  strength of right hand R29.898      Assessment:   Patient responded well to treatment session. Patient demonstrates with progress of R 5th digit ROM and R  strength.     Plan:   Continue with OT POC with focus on R 5th digit mobility .    Subjective   Patient agreeable to treatment session. No new issues to report.    Pain Assessment:  Pain Assessment  Pain Assessment: 0-10  0-10 (Numeric) Pain Score: 0 - No pain    Objective      Taken 24:  R : 43#  R PIP: 60 degrees  R DIP: 75 degrees    Unbillable time 5 mins:  Heat applied to R hand before exercises. Skin checks before and after application.     Manual Therapy: 15 mins  STM to volar and dorsal aspect of the R 5th digit  Retrograde massage to R 5th digit  Passive stretch to the R 5th digit at each joint separately, then compositely     Therapeutic Exercise: 15 mins  Completed red power web to the R hand 15x each: hook grasp; composite flexion, composite extension, digit adduction, digit abduction    Completed coordination activity of picking up pennies from table top with R thumb and pinky of the R hand. Completed multiple reps.     Utrasound 8 mins:  x8min continuous 3MHZ @1.0w/cm2 over volar and dorasl aspect of R 5th digit    OP EDUCATION:    Continue with HEP    Goals:  Active       OT Goals       HEP       Start:  06/13/24    Expected End:  08/22/24       Patient will be independent with established HEP         ROM       Start:  06/13/24    Expected End:  08/22/24       Patient will demonstrate increased 5th digit ROM as evident by being able to touch palmar crease         Strength        Start:  06/13/24    Expected End:  08/22/24       Patient will increase R  strength by 10-15# for ADL/IADL tasks

## 2024-07-11 ENCOUNTER — DOCUMENTATION (OUTPATIENT)
Dept: OCCUPATIONAL THERAPY | Facility: CLINIC | Age: 59
End: 2024-07-11
Payer: COMMERCIAL

## 2024-07-11 NOTE — PROGRESS NOTES
Occupational Therapy                 Therapy Communication Note    Patient Name: Yakov Mendes  MRN: [id number]  Today's Date: 7/11/2024     Discipline: Occupational Therapy        Missed Time: No Show

## 2024-07-15 ENCOUNTER — TREATMENT (OUTPATIENT)
Dept: OCCUPATIONAL THERAPY | Facility: CLINIC | Age: 59
End: 2024-07-15
Payer: COMMERCIAL

## 2024-07-15 DIAGNOSIS — R29.898 DECREASED GRIP STRENGTH OF RIGHT HAND: ICD-10-CM

## 2024-07-15 DIAGNOSIS — M25.641 DECREASED RANGE OF MOTION OF FINGER OF RIGHT HAND: Primary | ICD-10-CM

## 2024-07-15 DIAGNOSIS — M15.2 DEGENERATIVE ARTHRITIS OF PROXIMAL INTERPHALANGEAL JOINT OF LITTLE FINGER OF RIGHT HAND: ICD-10-CM

## 2024-07-15 PROCEDURE — 97140 MANUAL THERAPY 1/> REGIONS: CPT | Mod: GO

## 2024-07-15 PROCEDURE — 97110 THERAPEUTIC EXERCISES: CPT | Mod: GO

## 2024-07-15 PROCEDURE — 97035 APP MDLTY 1+ULTRASOUND EA 15: CPT | Mod: GO

## 2024-07-15 ASSESSMENT — PAIN - FUNCTIONAL ASSESSMENT: PAIN_FUNCTIONAL_ASSESSMENT: 0-10

## 2024-07-15 ASSESSMENT — PAIN SCALES - GENERAL: PAINLEVEL_OUTOF10: 0 - NO PAIN

## 2024-07-15 NOTE — PROGRESS NOTES
Occupational Therapy    Occupational Therapy Treatment    Name: Yakov Mendes  MRN: [id number]  : 1965  Date: 07/15/24  Time Calculation  Start Time: 225  Stop Time: 310  Time Calculation (min): 45 min     OT Therapeutic Procedures Time Entry  Manual Therapy Time Entry: 15  Therapeutic Exercise Time Entry: 15  OT Modalities Time Entry  Ultrasound Time Entry: 8               Visit: 4  Otriz  NO COPAY, 30 VISITS   Primary dx: Decreased ROM of R finger  Problem List Items Addressed This Visit             ICD-10-CM    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 M15.2    Decreased range of motion of finger of right hand - Primary M25.641    Decreased  strength of right hand R29.898       Assessment:   Patient responded well to treatment session. Making steady progress towards goals.  Plan:   Continue with OT POC with focus on R 5th digit mobility .    Subjective   Patient agreeable to treatment session. No new issues to report.    Pain Assessment:  Pain Assessment  Pain Assessment: 0-10  0-10 (Numeric) Pain Score: 0 - No pain    Objective      Taken 24:  R : 43#  R PIP: 60 degrees  R DIP: 75 degrees    Unbillable time 5 mins:  Heat applied to R hand before exercises. Skin checks before and after application.     Manual Therapy: 15 mins  STM to volar and dorsal aspect of the R 5th digit  Retrograde massage to R 5th digit  Passive stretch to the R 5th digit at each joint separately, then compositely     Therapeutic Exercise: 15 mins  Completed red putty exercises to the R hand for strengthening: digit flexion, digit extension, digit adduction, tip to tip pinch, lateral pinch, digit abduction. Cues given for proper technique.      Utrasound 8 mins:  x8min continuous 3MHZ @1.0w/cm2 over volar and dorasl aspect of R 5th digit    OP EDUCATION:   Continue with HEP- given red theraputty to replace yellow putty. Focus was on normalized movement pattern of the R  5th digit    Goals:  Active       OT Goals       HEP       Start:  06/13/24    Expected End:  08/22/24       Patient will be independent with established HEP         ROM       Start:  06/13/24    Expected End:  08/22/24       Patient will demonstrate increased 5th digit ROM as evident by being able to touch palmar crease         Strength        Start:  06/13/24    Expected End:  08/22/24       Patient will increase R  strength by 10-15# for ADL/IADL tasks

## 2024-07-18 ENCOUNTER — DOCUMENTATION (OUTPATIENT)
Dept: OCCUPATIONAL THERAPY | Facility: CLINIC | Age: 59
End: 2024-07-18
Payer: COMMERCIAL

## 2024-07-18 ENCOUNTER — APPOINTMENT (OUTPATIENT)
Dept: OCCUPATIONAL THERAPY | Facility: CLINIC | Age: 59
End: 2024-07-18
Payer: COMMERCIAL

## 2024-07-18 NOTE — PROGRESS NOTES
Occupational Therapy                 Therapy Communication Note    Patient Name: Yakov Mendes  MRN: [id number]  Today's Date: 7/18/2024     Discipline: Occupational Therapy      Missed Time: Cancel

## 2024-07-22 ENCOUNTER — TREATMENT (OUTPATIENT)
Dept: OCCUPATIONAL THERAPY | Facility: CLINIC | Age: 59
End: 2024-07-22
Payer: COMMERCIAL

## 2024-07-22 DIAGNOSIS — M15.2 DEGENERATIVE ARTHRITIS OF PROXIMAL INTERPHALANGEAL JOINT OF LITTLE FINGER OF RIGHT HAND: ICD-10-CM

## 2024-07-22 DIAGNOSIS — M25.641 DECREASED RANGE OF MOTION OF FINGER OF RIGHT HAND: Primary | ICD-10-CM

## 2024-07-22 DIAGNOSIS — R29.898 DECREASED GRIP STRENGTH OF RIGHT HAND: ICD-10-CM

## 2024-07-22 PROCEDURE — 97110 THERAPEUTIC EXERCISES: CPT | Mod: GO

## 2024-07-22 PROCEDURE — 97035 APP MDLTY 1+ULTRASOUND EA 15: CPT | Mod: GO

## 2024-07-22 PROCEDURE — 97140 MANUAL THERAPY 1/> REGIONS: CPT | Mod: GO

## 2024-07-22 ASSESSMENT — PAIN - FUNCTIONAL ASSESSMENT: PAIN_FUNCTIONAL_ASSESSMENT: 0-10

## 2024-07-22 ASSESSMENT — PAIN SCALES - GENERAL: PAINLEVEL_OUTOF10: 0 - NO PAIN

## 2024-07-22 NOTE — PROGRESS NOTES
Occupational Therapy    Occupational Therapy Treatment    Name: Yakov Mendes  MRN: [id number]  : 1965  Date: 24  Time Calculation  Start Time: 315  Stop Time: 400  Time Calculation (min): 45 min     OT Therapeutic Procedures Time Entry  Manual Therapy Time Entry: 15  Therapeutic Exercise Time Entry: 15  OT Modalities Time Entry  Ultrasound Time Entry: 8               Visit: 5  Ortiz  NO COPAY, 30 VISITS   Primary dx: Decreased ROM of R finger  Problem List Items Addressed This Visit             ICD-10-CM    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 M15.2    Decreased range of motion of finger of right hand - Primary M25.641    Decreased  strength of right hand R29.898       Assessment:   Patient responded well to treatment session. Making steady progress towards goals ( see objective section.)  Patient would benefit from a PIP flexion Judith splint for the R 5th digit to maximize patient's functional digit mobility.  DOS: 24  Plan:   Continue with OT POC with focus on R 5th digit mobility.   Will go down to 1x a week.     Subjective   Patient agreeable to treatment session. No new issues to report.    Pain Assessment:  Pain Assessment  Pain Assessment: 0-10  0-10 (Numeric) Pain Score: 0 - No pain    Objective      Taken 24:  R : 55#  R PIP: 65 degrees  R DIP: 85 degrees    Unbillable time 5 mins:  Heat applied to R hand before exercises. Skin checks before and after application.     Manual Therapy: 15 mins  STM to volar and dorsal aspect of the R 5th digit  Retrograde massage to R 5th digit  Passive stretch to the R 5th digit at each joint separately, then compositely     Therapeutic Exercise: 15 mins  Completed red flex bar with focus on R  during movement patterns: wrist flexion, wrist extension, pronation, supination.     Utrasound 8 mins:  x8min continuous 3MHZ @1.0w/cm2 over volar and dorasl aspect of R 5th digit    OP  EDUCATION:   Continue with HEP    Goals:  Active       OT Goals       HEP       Start:  06/13/24    Expected End:  08/22/24       Patient will be independent with established HEP         ROM       Start:  06/13/24    Expected End:  08/22/24       Patient will demonstrate increased 5th digit ROM as evident by being able to touch palmar crease         Strength        Start:  06/13/24    Expected End:  08/22/24       Patient will increase R  strength by 10-15# for ADL/IADL tasks

## 2024-07-25 ENCOUNTER — APPOINTMENT (OUTPATIENT)
Dept: OCCUPATIONAL THERAPY | Facility: CLINIC | Age: 59
End: 2024-07-25
Payer: COMMERCIAL

## 2024-07-26 DIAGNOSIS — M25.641 DECREASED RANGE OF MOTION OF FINGER OF RIGHT HAND: ICD-10-CM

## 2024-07-29 ENCOUNTER — APPOINTMENT (OUTPATIENT)
Dept: OCCUPATIONAL THERAPY | Facility: CLINIC | Age: 59
End: 2024-07-29
Payer: COMMERCIAL

## 2024-08-01 ENCOUNTER — TREATMENT (OUTPATIENT)
Dept: OCCUPATIONAL THERAPY | Facility: CLINIC | Age: 59
End: 2024-08-01
Payer: COMMERCIAL

## 2024-08-01 DIAGNOSIS — M15.2 DEGENERATIVE ARTHRITIS OF PROXIMAL INTERPHALANGEAL JOINT OF LITTLE FINGER OF RIGHT HAND: ICD-10-CM

## 2024-08-01 DIAGNOSIS — R29.898 DECREASED GRIP STRENGTH OF RIGHT HAND: ICD-10-CM

## 2024-08-01 DIAGNOSIS — M25.641 DECREASED RANGE OF MOTION OF FINGER OF RIGHT HAND: Primary | ICD-10-CM

## 2024-08-01 PROCEDURE — 97035 APP MDLTY 1+ULTRASOUND EA 15: CPT | Mod: GO

## 2024-08-01 PROCEDURE — 97140 MANUAL THERAPY 1/> REGIONS: CPT | Mod: GO

## 2024-08-01 ASSESSMENT — PAIN - FUNCTIONAL ASSESSMENT: PAIN_FUNCTIONAL_ASSESSMENT: 0-10

## 2024-08-01 ASSESSMENT — PAIN SCALES - GENERAL: PAINLEVEL_OUTOF10: 0 - NO PAIN

## 2024-08-01 NOTE — PROGRESS NOTES
Occupational Therapy    Occupational Therapy Treatment    Name: Yakov Mendes  MRN: [id number]  : 1965  Date: 24  Time Calculation  Start Time: 315  Stop Time: 350  Time Calculation (min): 35 min     OT Therapeutic Procedures Time Entry  Manual Therapy Time Entry: 20  OT Modalities Time Entry  Ultrasound Time Entry: 8               Visit: 6  Ortiz  NO COPAY, 30 VISITS   Primary dx: Decreased ROM of R finger  Problem List Items Addressed This Visit             ICD-10-CM    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 M15.2    Decreased range of motion of finger of right hand - Primary M25.641    Decreased  strength of right hand R29.898       Assessment:   Patient has made ROM and strength gains since starting OT. Patient feels he can now just continue with the home program. I gave him the contact info for the judith splint.   I also recommended patient to follow up with Dr. Manuel.  Plan:   Goals met. Discharge  Patient in agreement with plan.    Subjective   Patient agreeable to treatment session. No new issues to report. Patient reports has not heard from Judith splint yet.     Pain Assessment:  Pain Assessment  Pain Assessment: 0-10  0-10 (Numeric) Pain Score: 0 - No pain    Objective      Taken 24:  R : 65#  R PIP: 65 degrees  R DIP: 90 degrees    Unbillable time 5 mins:  Heat applied to R hand before exercises. Skin checks before and after application.     Manual Therapy: 20 mins  STM to volar and dorsal aspect of the R 5th digit  Retrograde massage to R 5th digit  Passive stretch to the R 5th digit at each joint separately, then compositely     Utrasound 8 mins:  x8min continuous 3MHZ @1.0w/cm2 over volar and dorasl aspect of R 5th digit    OP EDUCATION:   Continue with HEP after discharge    Goals:  Resolved       OT Goals       HEP (Met)       Start:  24    Expected End:  24    Resolved:  24    Patient will be  independent with established HEP         ROM (Met)       Start:  06/13/24    Expected End:  08/22/24    Resolved:  08/01/24    Patient will demonstrate increased 5th digit ROM as evident by being able to touch palmar crease         Strength  (Met)       Start:  06/13/24    Expected End:  08/22/24    Resolved:  08/01/24    Patient will increase R  strength by 10-15# for ADL/IADL tasks

## 2024-08-08 ENCOUNTER — APPOINTMENT (OUTPATIENT)
Dept: OCCUPATIONAL THERAPY | Facility: CLINIC | Age: 59
End: 2024-08-08
Payer: COMMERCIAL

## 2024-10-02 ENCOUNTER — OFFICE VISIT (OUTPATIENT)
Dept: URGENT CARE | Age: 59
End: 2024-10-02
Payer: COMMERCIAL

## 2024-10-02 VITALS
SYSTOLIC BLOOD PRESSURE: 124 MMHG | DIASTOLIC BLOOD PRESSURE: 82 MMHG | TEMPERATURE: 98.1 F | HEART RATE: 107 BPM | WEIGHT: 215 LBS | OXYGEN SATURATION: 96 % | RESPIRATION RATE: 18 BRPM | BODY MASS INDEX: 29.12 KG/M2

## 2024-10-02 DIAGNOSIS — Z71.1 CONCERN ABOUT SEXUALLY TRANSMITTED DISEASE IN MALE WITHOUT DIAGNOSIS: Primary | ICD-10-CM

## 2024-10-02 LAB
POC APPEARANCE, URINE: CLEAR
POC BILIRUBIN, URINE: NEGATIVE
POC BLOOD, URINE: NEGATIVE
POC COLOR, URINE: YELLOW
POC GLUCOSE, URINE: NEGATIVE MG/DL
POC KETONES, URINE: NEGATIVE MG/DL
POC LEUKOCYTES, URINE: NEGATIVE
POC NITRITE,URINE: NEGATIVE
POC PH, URINE: 6 PH
POC PROTEIN, URINE: NEGATIVE MG/DL
POC SPECIFIC GRAVITY, URINE: 1.02
POC UROBILINOGEN, URINE: 0.2 EU/DL

## 2024-10-02 PROCEDURE — 87491 CHLMYD TRACH DNA AMP PROBE: CPT

## 2024-10-02 PROCEDURE — 87591 N.GONORRHOEAE DNA AMP PROB: CPT

## 2024-10-02 PROCEDURE — 87661 TRICHOMONAS VAGINALIS AMPLIF: CPT

## 2024-10-02 NOTE — PATIENT INSTRUCTIONS
"Sexually Transmitted Infection    What are sexually transmitted infections?   Sexually transmitted infections, often called STIs, are infections you can catch during sex. They are also called sexually transmitted STDs. Some STIs are caused by bacteria, and others are caused by viruses.  The most common STIs include:  -Chlamydia  -Gonorrhea  -Mycoplasma genitalium  -Genital herpes, also called \"herpes simplex virus\" or \"HSV\"  -Genital warts, also called \"human papillomavirus\" or \"HPV\" - Some types of HPV can cause cervical cancer in women.  -Hepatitis A, B, and C  -Syphilis  -Trichomoniasis  -Human immunodeficiency virus, also called \"HIV\" - This is the virus that causes AIDS.    Many of these infections can be transmitted through any type of sex, vaginal, anal or oral. HIV and hepatitis can be transmitted in other ways, too, such as exposure to body fluids.    What symptoms should I watch for?   In general, watch out for any genital itching, burning, sores, or discharge. But be aware that many STIs do not cause any symptoms. The best way to know for sure if you have an STI is to be screened.    What if I have an STI?   If you have an STI, you will need treatment. The right treatment will depend on the type of STI you have. Treatment might include antibiotics or medicines called antivirals, which fight viruses. Treatment will cure your infection or keep it from getting worse. It will also reduce the chances that you spread your infection to others.  If you do have an infection, you might need to tell the people you could have infected. Your doctor or nurse can help you figure out which partners you need to tell based on when you last had sex with them.    Can STIs be prevented?   - There is no surefire way to prevent all STIs, but there are things you can do to reduce your chances of catching one.  -The most important thing you can do is to wear a condom every time you have sex. Both male and female condoms can " "protect against STIs. But be aware that male condoms made out of \"natural materials,\" such as sheep intestine, do not protect against STIs.  -Ask your doctor if there are any vaccines you should have. If you are 26 years old or younger, you can get a vaccine to protect against HPV, the virus that causes genital warts. If you do not have hepatitis A or B and have not already gotten the vaccine for hepatitis A or B, you can get those vaccines, too.  -If your partner has herpes, he or she can reduce the chances of infecting you by taking a medicine called valacyclovir (brand name: Valtrex).  -If you are at very high risk of catching HIV, you might be able to take a pill every day to reduce the chances that you will get HIV. This is an option only for very few at-risk people. If you are interested in this, talk to your doctor.    Home Care:  1. If prescribed antibiotics, you must remain abstinent for 10 days after beginning antibiotic, or you can reinfect yourself or your partner   2. You will be notified if you are positive for gonorrhea, Chlamydia, or trichomonas  3. If you do not improve or you begin to worsen please follow up with your primary care physician.    Call your doctor or go to the emergency department if worse or:   1. Fever begins.   2. Back pain, fever, or chills occur.   3. Patient appears more ill.  4. Unable to urinate.  5. Severe or new onset of abdominal pain.   "

## 2024-10-02 NOTE — PROGRESS NOTES
"Subjective   Patient ID: Yakov Mendes is a 59 y.o. male. They present today with a chief complaint of Exposure to STD (Sti testing ).    History of Present Illness  HPI  Patient presents to the urgent care with concerns for possible STI.  Patient states his partner complained of vaginal pain the day after having intercourse 2 weeks ago.  Patient denies any symptoms at this time.  Patient's partner has not undergone any testing.  Past Medical History  Allergies as of 10/02/2024    (No Known Allergies)       (Not in a hospital admission)       Past Medical History:   Diagnosis Date    GERD (gastroesophageal reflux disease)     Hypertension     Personal history of other diseases of the circulatory system     History of hypertension    Personal history of other endocrine, nutritional and metabolic disease     History of hyperlipidemia       Past Surgical History:   Procedure Laterality Date    CT ANGIO NECK  12/4/2018    CT NECK ANGIO W AND WO IV CONTRAST 12/4/2018 [facility] [place]    OTHER SURGICAL HISTORY  03/11/2021    Full thickness skin graft        reports that he has been smoking cigars. He has never used smokeless tobacco. He reports current alcohol use of about 2.0 standard drinks of alcohol per week. He reports that he does not currently use drugs after having used the following drugs: \"Crack\" cocaine.    Review of Systems  Review of Systems    Patient denies recent travel, groin/scrotal/testicular pain, changes in BM, flank or back pain, vomiting, diarrhea, dark or bloody stools, dysuria, fever, sore throat, rash, dizziness, shortness of breath, increased urinary frequency, chills, peripheral edema, chest pain and myalgias.                          Objective    Vitals:    10/02/24 1417   BP: 124/82   BP Location: Left arm   Patient Position: Sitting   Pulse: 107   Resp: 18   Temp: 36.7 °C (98.1 °F)   TempSrc: Oral   SpO2: 96%   Weight: 97.5 kg (215 lb)     No LMP for male patient.    Physical " Exam  Appearance: Alert, oriented, cooperative, in no acute distress. Well nourished & well hydrated.    Skin: Intact, no lesions, rash, petechiae or purpura. Good skin turgor    Eyes: Conjunctiva pink with no redness or exudates. Eyelids without lesions. No scleral icterus.     ENT: Hearing grossly intact. External inspection of ears without lesions or erythema. no nasal flaring. moist oral mucosa, no tripoding, no drooling, no difficulty swallowing oral secretions.    Pulmonary:  Good chest wall excursion. No accessory muscle use or stridor. no difficulty completing full sentences without taking a breath    Cardiac:  No JVD.     Musculoskeletal: no deformity. No cyanosis, clubbing.     Neurological: no focal findings identified.    Psychiatric: Appropriate mood and affect.    Procedures    Point of Care Test & Imaging Results from this visit  Results for orders placed or performed in visit on 10/02/24   POCT UA Automated manually resulted   Result Value Ref Range    POC Color, Urine Yellow Straw, Yellow, Light-Yellow    POC Appearance, Urine Clear Clear    POC Glucose, Urine NEGATIVE NEGATIVE mg/dl    POC Bilirubin, Urine NEGATIVE NEGATIVE    POC Ketones, Urine NEGATIVE NEGATIVE mg/dl    POC Specific Gravity, Urine 1.025 1.005 - 1.035    POC Blood, Urine NEGATIVE NEGATIVE    POC PH, Urine 6.0 No Reference Range Established PH    POC Protein, Urine NEGATIVE NEGATIVE, 30 (1+) mg/dl    POC Urobilinogen, Urine 0.2 0.2, 1.0 EU/DL    Poc Nitrite, Urine NEGATIVE NEGATIVE    POC Leukocytes, Urine NEGATIVE NEGATIVE      No results found.    Diagnostic study results (if any) were reviewed by Nikki Elizabeth PA-C.    Assessment/Plan   Allergies, medications, history, and pertinent labs/EKGs/Imaging reviewed by Nikki Elizabeth PA-C.     Medical Decision Making  Patient denies any symptoms or known exposure. Pt reports his partner had pain the next day after sexual intercourse. Patient would like to be notified of  results.  Patient does not use MyChart.  Discussed with patient approximately 50% of STI's do not have symptoms due to the slow replication cycle of pathogens, the absence on sx's does not rule out an STI. In the urgent care we can not test for herpes unless u have a lesion.  Cultures will be sent out for gonorrhea, chlamydia, trichomonas. Patient verbalizes understanding and agrees with plan of care. All questions were answered.  Dictation software was used in the creation of this note which does not evaluate or correct for typographical, spelling, syntax or grammatical errors.    Orders and Diagnoses  Diagnoses and all orders for this visit:  Concern about sexually transmitted disease in male without diagnosis  -     Trichomonas vaginalis, Amplified  -     C. trachomatis / N. gonorrhoeae, Amplified  -     POCT UA Automated manually resulted      Medical Admin Record      Patient disposition: Home    Electronically signed by Nikki Elizabeth PA-C  3:26 PM

## 2024-10-03 LAB
C TRACH RRNA SPEC QL NAA+PROBE: NEGATIVE
N GONORRHOEA DNA SPEC QL PROBE+SIG AMP: NEGATIVE
T VAGINALIS RRNA SPEC QL NAA+PROBE: NEGATIVE

## 2024-11-21 RX ORDER — OMEPRAZOLE 40 MG/1
40 CAPSULE, DELAYED RELEASE ORAL DAILY
COMMUNITY
Start: 2024-08-31

## 2024-11-24 PROBLEM — K76.0 FATTY (CHANGE OF) LIVER, NOT ELSEWHERE CLASSIFIED: Status: ACTIVE | Noted: 2021-07-15

## 2024-11-24 PROBLEM — M48.02 STENOSIS OF CERVICAL SPINE WITH MYELOPATHY: Status: ACTIVE | Noted: 2022-03-21

## 2024-11-24 PROBLEM — F14.11 HISTORY OF COCAINE ABUSE (MULTI): Status: ACTIVE | Noted: 2023-09-01

## 2024-11-24 PROBLEM — F41.1 GENERALIZED ANXIETY DISORDER: Status: ACTIVE | Noted: 2023-04-18

## 2024-11-24 PROBLEM — I10 PRIMARY HYPERTENSION: Status: ACTIVE | Noted: 2021-03-22

## 2024-11-24 PROBLEM — G99.2 STENOSIS OF CERVICAL SPINE WITH MYELOPATHY: Status: ACTIVE | Noted: 2022-03-21

## 2024-11-24 PROBLEM — N18.31 STAGE 3A CHRONIC KIDNEY DISEASE (MULTI): Status: ACTIVE | Noted: 2022-03-02

## 2024-11-24 PROBLEM — F12.21: Chronic | Status: ACTIVE | Noted: 2023-09-01

## 2024-11-24 PROBLEM — E78.2 MIXED HYPERLIPIDEMIA: Status: ACTIVE | Noted: 2021-03-22

## 2024-11-25 ENCOUNTER — OFFICE VISIT (OUTPATIENT)
Dept: CARDIOLOGY | Facility: HOSPITAL | Age: 59
End: 2024-11-25
Payer: COMMERCIAL

## 2024-11-25 ENCOUNTER — LAB (OUTPATIENT)
Dept: LAB | Facility: LAB | Age: 59
End: 2024-11-25
Payer: COMMERCIAL

## 2024-11-25 VITALS
DIASTOLIC BLOOD PRESSURE: 95 MMHG | WEIGHT: 216.4 LBS | HEIGHT: 72 IN | SYSTOLIC BLOOD PRESSURE: 135 MMHG | OXYGEN SATURATION: 96 % | BODY MASS INDEX: 29.31 KG/M2 | HEART RATE: 117 BPM

## 2024-11-25 DIAGNOSIS — R00.0 TACHYCARDIA: ICD-10-CM

## 2024-11-25 DIAGNOSIS — R07.89 CHEST DISCOMFORT: ICD-10-CM

## 2024-11-25 DIAGNOSIS — R00.0 SINUS TACHYCARDIA: ICD-10-CM

## 2024-11-25 DIAGNOSIS — R00.0 TACHYCARDIA: Primary | ICD-10-CM

## 2024-11-25 LAB
ALBUMIN SERPL BCP-MCNC: 4.5 G/DL (ref 3.4–5)
ALP SERPL-CCNC: 98 U/L (ref 33–136)
ALT SERPL W P-5'-P-CCNC: 53 U/L (ref 10–52)
ANION GAP SERPL CALC-SCNC: 15 MMOL/L (ref 10–20)
AST SERPL W P-5'-P-CCNC: 33 U/L (ref 9–39)
BILIRUB SERPL-MCNC: 0.9 MG/DL (ref 0–1.2)
BUN SERPL-MCNC: 17 MG/DL (ref 6–23)
CALCIUM SERPL-MCNC: 10.3 MG/DL (ref 8.6–10.6)
CARDIAC TROPONIN I PNL SERPL HS: 3 NG/L (ref 0–53)
CHLORIDE SERPL-SCNC: 105 MMOL/L (ref 98–107)
CHOLEST SERPL-MCNC: 160 MG/DL (ref 0–199)
CHOLESTEROL/HDL RATIO: 3.8
CO2 SERPL-SCNC: 26 MMOL/L (ref 21–32)
CREAT SERPL-MCNC: 0.94 MG/DL (ref 0.5–1.3)
EGFRCR SERPLBLD CKD-EPI 2021: >90 ML/MIN/1.73M*2
GLUCOSE SERPL-MCNC: 90 MG/DL (ref 74–99)
HDLC SERPL-MCNC: 42 MG/DL
LDLC SERPL CALC-MCNC: 93 MG/DL
NON HDL CHOLESTEROL: 118 MG/DL (ref 0–149)
POTASSIUM SERPL-SCNC: 4.6 MMOL/L (ref 3.5–5.3)
PROT SERPL-MCNC: 7.2 G/DL (ref 6.4–8.2)
SODIUM SERPL-SCNC: 141 MMOL/L (ref 136–145)
TRIGL SERPL-MCNC: 123 MG/DL (ref 0–149)
TSH SERPL-ACNC: <0.01 MIU/L (ref 0.44–3.98)
VLDL: 25 MG/DL (ref 0–40)

## 2024-11-25 PROCEDURE — 80053 COMPREHEN METABOLIC PANEL: CPT

## 2024-11-25 PROCEDURE — 3075F SYST BP GE 130 - 139MM HG: CPT | Performed by: STUDENT IN AN ORGANIZED HEALTH CARE EDUCATION/TRAINING PROGRAM

## 2024-11-25 PROCEDURE — 84443 ASSAY THYROID STIM HORMONE: CPT

## 2024-11-25 PROCEDURE — 80061 LIPID PANEL: CPT

## 2024-11-25 PROCEDURE — 36415 COLL VENOUS BLD VENIPUNCTURE: CPT

## 2024-11-25 PROCEDURE — 99204 OFFICE O/P NEW MOD 45 MIN: CPT | Performed by: STUDENT IN AN ORGANIZED HEALTH CARE EDUCATION/TRAINING PROGRAM

## 2024-11-25 PROCEDURE — 3008F BODY MASS INDEX DOCD: CPT | Performed by: STUDENT IN AN ORGANIZED HEALTH CARE EDUCATION/TRAINING PROGRAM

## 2024-11-25 PROCEDURE — 84484 ASSAY OF TROPONIN QUANT: CPT

## 2024-11-25 PROCEDURE — 3080F DIAST BP >= 90 MM HG: CPT | Performed by: STUDENT IN AN ORGANIZED HEALTH CARE EDUCATION/TRAINING PROGRAM

## 2024-11-25 PROCEDURE — 99214 OFFICE O/P EST MOD 30 MIN: CPT | Performed by: STUDENT IN AN ORGANIZED HEALTH CARE EDUCATION/TRAINING PROGRAM

## 2024-11-25 PROCEDURE — 93005 ELECTROCARDIOGRAM TRACING: CPT | Performed by: STUDENT IN AN ORGANIZED HEALTH CARE EDUCATION/TRAINING PROGRAM

## 2024-11-25 ASSESSMENT — PATIENT HEALTH QUESTIONNAIRE - PHQ9
SUM OF ALL RESPONSES TO PHQ9 QUESTIONS 1 AND 2: 0
2. FEELING DOWN, DEPRESSED OR HOPELESS: NOT AT ALL
1. LITTLE INTEREST OR PLEASURE IN DOING THINGS: NOT AT ALL

## 2024-11-25 ASSESSMENT — ENCOUNTER SYMPTOMS
ORTHOPNEA: 0
NEAR-SYNCOPE: 0
SHORTNESS OF BREATH: 0
PND: 0
DEPRESSION: 0
PALPITATIONS: 1
DYSPNEA ON EXERTION: 1
LOSS OF SENSATION IN FEET: 0
SYNCOPE: 0
OCCASIONAL FEELINGS OF UNSTEADINESS: 1

## 2024-11-25 ASSESSMENT — PAIN SCALES - GENERAL: PAINLEVEL_OUTOF10: 0-NO PAIN

## 2024-11-25 NOTE — PROGRESS NOTES
[facility] - General Cardiology Note                                                                                        Reason for Visit: New Patient Visit.  Referring Clinician: Milligan     History of Present Illness  Yakov Mendes is a 59 y.o. male with history of prediabetes, essential hypertension on amlodipine, sinus tachycardia, who who was referred to cardiology for sinus tachycardia    Patient reports that for the past 2 months, he has been experiencing chest heaviness and palpitations.  Chest heaviness happens mainly when he is laying flat in bed.  It improves with walking around.  He patient also reports shortness of breath when he exercises or has sexual activity.  He also reports palpitations all the time.  Patient reports that his swelling and he has tremors.  Otherwise, no syncope, presyncope, lower extremity swelling, orthopnea, or PND.      Past Medical History  He has a past medical history of GERD (gastroesophageal reflux disease), Hypertension, Personal history of other diseases of the circulatory system, and Personal history of other endocrine, nutritional and metabolic disease.    He has no past medical history of Asthma, Awareness under anesthesia, Chronic kidney disease, Delayed emergence from general anesthesia, Diabetes mellitus type I (Multi), Disease of thyroid gland, Hard to intubate, HL (hearing loss), Irregular heart beat, Malignant hyperthermia, Myocardial infarction (Multi), Pancreatitis ([facility]), PONV (postoperative nausea and vomiting), Sleep apnea, or Type 2 diabetes mellitus.    Past Surgical History  He has a past surgical history that includes Other surgical history (03/11/2021) and CT angio neck (12/4/2018).    Social History  He reports that he has been smoking cigars. He has never used smokeless tobacco. He reports current alcohol use of about 2.0 standard drinks of alcohol per week. He reports that he does not  "currently use drugs after having used the following drugs: \"Crack\" cocaine.    Family History  Family History   Problem Relation Name Age of Onset    Diabetes Mother      Diabetes Father         Allergies  Patient has no known allergies.    Outpatient Medications  Current Outpatient Medications   Medication Instructions    amLODIPine (Norvasc) 5 mg tablet Take by mouth once daily.    atorvastatin (LIPITOR) 20 mg    fenofibrate (TRICOR) 48 mg, Daily RT    HYDROcodone-acetaminophen (Norco) 5-325 mg tablet 1 tablet, oral, Every 6 hours PRN    multivitamin tablet 1 tablet, Daily    omeprazole (PRILOSEC) 40 mg, oral, Daily    omeprazole OTC (PRILOSEC OTC) 40 mg, Daily before breakfast    sildenafil (Viagra) 100 mg tablet Take 1 tablet by mouth daily as needed 30-60 minutes before intercourse    triamterene-hydrochlorothiazid (Dyazide) 37.5-25 mg capsule 1 capsule, Daily RT       Review of Systems  Review of Systems   Cardiovascular:  Positive for chest pain, dyspnea on exertion and palpitations. Negative for leg swelling, near-syncope, orthopnea, paroxysmal nocturnal dyspnea and syncope.   Respiratory:  Negative for shortness of breath.        Last Recorded Vitals  Vitals:    11/25/24 1323   BP: (!) 135/95   BP Location: Left arm   Patient Position: Sitting   BP Cuff Size: Adult   Pulse: (!) 117   SpO2: 96%   Weight: 98.2 kg (216 lb 6.4 oz)   Height: 1.829 m (6')       Physical Examination  General: Sweaty, anxious -American gentleman.   HEENT: Normocephalic atraumatic, pupils equal and reactive to light, extraocular muscles intact, no conjunctival injection, oropharynx clear without exudates.  Neck: Normal carotid arterial pulses, no arterial bruits, no thyromegaly.  Cardiac: Regular rhythm and normal heart rate.  S1, S2 present and normal.  No murmurs, rubs, or gallops.  PMI is nondisplaced. Jugular venous pulsations are normal.  Pulmonary: Normal breath sounds, no increased work of breathing, no wheezes or " "crackles.  GI: Normal bowel sounds, abdominal aorta not enlarged, no hepatosplenomegaly, no abdominal bruits.  Lower extremities: No cyanosis, clubbing, or edema.  No xanthelasma present. Normal distal pulses.  Skin: Sweaty palms   Neuro: Alert and oriented x 3, normal attention and cognition, no focal motor or sensory neurologic deficits.  Coarse tremors   Psych: Normal affect and mood.  Musculoskeletal: Normal gait normal muscle tone.    Laboratory Studies  Lab Results   Component Value Date    GLUCOSE 93 05/16/2024    CALCIUM 10.2 05/16/2024     05/16/2024    K 4.0 05/16/2024    CO2 25 05/16/2024     05/16/2024    BUN 16 05/16/2024    CREATININE 1.01 05/16/2024     Lab Results   Component Value Date    ALT 45 10/19/2017    AST 29 10/19/2017    ALKPHOS 49 10/19/2017    BILITOT 0.3 10/19/2017         No results found for: \"CHOL\"  No results found for: \"HDL\"  No results found for: \"LDLCALC\"  No results found for: \"TRIG\"  No components found for: \"CHOLHDL\"  Lab Results   Component Value Date    HGBA1C 6.0 (H) 03/02/2022     No components found for: \"UACR\"    Cardiology Tests  ECG:   ECG 12 Lead 05/16/2024  I personally reviewed the imaging studies and EKG with patient       Assessment and Plan  Yakov Mendes is a 59 y.o. male with history of prediabetes, essential hypertension on amlodipine, sinus tachycardia, who who was referred to cardiology for sinus tachycardia. His symptoms are suspicious for hyperthyroidism.     # Tachycardia and palpitations: Send TSH and Holter monitor to rule out any serious arrhythmias.  #Chest discomfort: Unlikely to be cardiac in nature as it improves with activity.  #Dyspnea on exertion: Might be related to hyperthyroidism as above.  We will send some labs and wait for the results to further conclude our assessment.    Problem List Items Addressed This Visit    None  Visit Diagnoses       Tachycardia    -  Primary    Relevant Orders    ECG 12 lead (Clinic Performed)    " TSH    Lipid panel    Comprehensive metabolic panel    Holter Or Event Cardiac Monitor    Troponin I, High Sensitivity    Sinus tachycardia        Relevant Orders    TSH    Lipid panel    Comprehensive metabolic panel    Holter Or Event Cardiac Monitor    Troponin I, High Sensitivity    Chest discomfort        Relevant Orders    TSH    Lipid panel    Comprehensive metabolic panel    Holter Or Event Cardiac Monitor    Troponin I, High Sensitivity                  Shashi Craft MD

## 2024-11-26 LAB
ATRIAL RATE: 110 BPM
P AXIS: 27 DEGREES
P OFFSET: 188 MS
P ONSET: 132 MS
PR INTERVAL: 162 MS
Q ONSET: 213 MS
QRS COUNT: 18 BEATS
QRS DURATION: 80 MS
QT INTERVAL: 342 MS
QTC CALCULATION(BAZETT): 462 MS
QTC FREDERICIA: 418 MS
R AXIS: -2 DEGREES
T AXIS: 27 DEGREES
T OFFSET: 384 MS
VENTRICULAR RATE: 110 BPM

## 2024-11-26 NOTE — OP NOTE
ORTHOPEDIC OPERATIVE NOTE    Name:     Yakov Mendes  :     1965  Facility:    [facility]  Date of Surgery:   2024     PREOP DX:          Posttraumatic arthritis, right small PIP    POSTOP DX:       Same    PROCEDURE:     Right small PIP Silastic arthroplasty    SURGEON: JR Kaleb MD    RESIDENT/FELLOW/ASSISTANT:  Karlo Stephens MD    ANESTHESIA:    General via LMA    ESTIMATED BLOOD LOSS :   2 ml    TOTAL FLUIDS:     500 cc LR    SPECIMEN:   None    TOURNIQUET TIME:   36 minutes at 250 mmHg    IMPLANTS:   Integra #2 PIP Silastic arthroplasty    COMPLICATIONS:  None    PATIENT RETURNED TO/CONDITION:  PACU in Good      INDICATIONS:      Yakov Mendes is a 59 y.o., right-hand-dominant male who presents with significant angular deformity, stiffness, and pain of the right small finger PIP.  X-rays confirmed narrowing, sclerosis, and spur formation consistent with posttraumatic osteoarthritis.  In order to maximize return to function and minimize progression of arthritis, he is here for elective PIP Silastic arthroplasty.  I reminded him of surgical risks of infection, scarring, damage to nerves, tendons, or vessels, stiffness, wound healing problems, failure to relieve symptoms, recurrent symptoms, progressive arthritis, bony fracture, implant fracture, and need for further surgery.  He voiced understanding and wished to proceed.      NARRATIVE:       Following identification of patient and confirmation of correct site of surgery and signed operative consent, he was brought to the operating room and a hand table affixed to the cart.  A general anesthetic was administered via LMA by Anesthesia, along with IV antibiotic dose.  A pneumatic tourniquet was placed high on the right arm, and the limb was prepped from fingertip to cuff with chlorhexidine, and draped free in the usual sterile fashion.  A digital block was administered using 8 cc of a mix of half percent Marcaine and 1%  lidocaine plain.  The limb was exsanguinated with an Esmarch, and the tourniquet inflated.  The hand was secured in a lead hand daniel, and all bony prominences padded.    A standard volar W incision was made, centered on the PIP, and taken carefully down under high loupe magnification.  Crossing veins were treated with bipolar and divided.  Flaps were elevated from the flexor sheath, which was in good condition.  Both neurovascular bundles were identified and protected throughout the case.  The A3 and C1 pulleys were then released from their ulnar attachments.  There was some significant tenosynovitis within the flexor sheath, which was carefully extracted using tenotomy scissors and a small rongeur, and taking great care not to injure the tendon fibers themselves.  Retracting the tendons, there was a full-thickness hole through the volar plate across its full width, explaining the irritation of the tendons.  The collateral ligaments were then sequentially released from their origins and the joint was shotgunned.  There was bone-on-bone arthritis evident on each side of the joint with full-thickness cartilage loss.  A small saw was then used to make a transverse osteotomy in the proximal phalanx head, just distal to the level of the collateral ligament origins, square to the shaft.  The proximal phalanx canal was sequentially reamed and broached up to accept a size #2 Integra Silastic implant.  The canal at the middle phalanx base was opened and sequentially reamed and broached as well.  The #2 trial was then placed and the joint reduced.  The angular deformity was found to be fully corrected.  There was good full composite passive flexion extension without implant instability.  The trial was removed, and the joint cavity copiously irrigated.  The actual #2 implant was then placed in standard fashion, and tracked nicely as well.  The A2 pulley was repaired with interrupted 5-0 Prolene.  The tourniquet was deflated,  No and pink color rapidly returned to all nailbeds.  Meticulous hemostasis was achieved.  After irrigation, the skin was closed with interrupted 5-0 Prolene.  Xeroform and soft dressing was applied.  The patient was then awakened, extubated, and transferred to Recovery in stable condition.          Electronically signed  CHENG Manuel MD  [phone]

## 2024-12-02 ENCOUNTER — APPOINTMENT (OUTPATIENT)
Dept: CARDIOLOGY | Facility: HOSPITAL | Age: 59
End: 2024-12-02
Payer: COMMERCIAL

## 2024-12-03 ENCOUNTER — DOCUMENTATION (OUTPATIENT)
Dept: CARDIOLOGY | Facility: HOSPITAL | Age: 59
End: 2024-12-03
Payer: COMMERCIAL

## 2024-12-03 DIAGNOSIS — E05.90 HYPERTHYROIDISM: Primary | ICD-10-CM

## 2024-12-03 RX ORDER — PROPRANOLOL HYDROCHLORIDE 10 MG/1
10 TABLET ORAL 3 TIMES DAILY
Qty: 90 TABLET | Refills: 2 | Status: SHIPPED | OUTPATIENT
Start: 2024-12-03

## 2024-12-03 NOTE — PROGRESS NOTES
Called patient regarding lab results. We discussed with him that he has hyperthyroidism and needs to follow-up with endocrinology. We explained in simple terms lines of treatment. Patient is discouraged but understands. In the meantime, we wrote him on propranolol to help with the symptoms./

## 2025-02-11 PROBLEM — R00.0 TACHYCARDIA: Status: ACTIVE | Noted: 2025-02-11

## 2025-02-11 PROBLEM — F10.10 ALCOHOL ABUSE: Status: ACTIVE | Noted: 2023-04-18

## 2025-02-27 PROBLEM — H44.9 DISORDER OF GLOBE: Status: ACTIVE | Noted: 2025-02-27

## 2025-02-27 PROBLEM — K11.7 XEROSTOMIA: Status: ACTIVE | Noted: 2025-02-27

## 2025-02-27 PROBLEM — E05.90 HYPERTHYROIDISM: Status: ACTIVE | Noted: 2024-12-30

## 2025-02-27 PROBLEM — E66.3 OVERWEIGHT: Status: ACTIVE | Noted: 2024-12-30

## 2025-02-27 PROBLEM — M48.02 SPINAL STENOSIS OF CERVICAL REGION: Status: ACTIVE | Noted: 2024-04-12

## 2025-04-04 ENCOUNTER — APPOINTMENT (OUTPATIENT)
Dept: ENDOCRINOLOGY | Facility: CLINIC | Age: 60
End: 2025-04-04
Payer: COMMERCIAL

## 2025-04-04 VITALS
WEIGHT: 215 LBS | HEIGHT: 72 IN | DIASTOLIC BLOOD PRESSURE: 86 MMHG | BODY MASS INDEX: 29.12 KG/M2 | HEART RATE: 102 BPM | SYSTOLIC BLOOD PRESSURE: 127 MMHG

## 2025-04-04 DIAGNOSIS — E05.00 GRAVES DISEASE: Primary | ICD-10-CM

## 2025-04-04 PROCEDURE — 3079F DIAST BP 80-89 MM HG: CPT | Performed by: INTERNAL MEDICINE

## 2025-04-04 PROCEDURE — 3008F BODY MASS INDEX DOCD: CPT | Performed by: INTERNAL MEDICINE

## 2025-04-04 PROCEDURE — 3074F SYST BP LT 130 MM HG: CPT | Performed by: INTERNAL MEDICINE

## 2025-04-04 PROCEDURE — 99204 OFFICE O/P NEW MOD 45 MIN: CPT | Performed by: INTERNAL MEDICINE

## 2025-04-04 RX ORDER — OMEPRAZOLE 40 MG/1
40 CAPSULE, DELAYED RELEASE ORAL
COMMUNITY
Start: 2025-03-14

## 2025-04-04 NOTE — PROGRESS NOTES
Chief complaint: new patient for abnormal thyroid labs    HPI:    The patient is a 60 year old male with a past medical history significant of but not limited to astigmatism, HLD, HTN, CKD 3a who presents to the office today to establish care with us for abnormal thyroid labs.    He had problems with his heart as he felt palpitations which is when he went to the heart doctor, who did thyroid labs for him which revealed TSH <0.01 in 11/2024. Per chart review, he had his Tsh checked in 2020 and it was 0.698. Cardiology started him on propranolol TID but he takes it only 1 time a day.    He states he is feeling alright right now and denies any active concerns. He just wants this taken care of.    Energy: he is not a morning person. Wakes up at noon, but not refreshed. Is able to get through chores. Is able to get up and down the stairs but not run.  Sleep: not good. Sleeps about 3 hrs and then wakes up to eat. It takes him a couple of hours to get back to sleep. +snore  Appetite: increased since almost a year  Weight: losing a few lbs ( about 5 lbs in the last 5-6 months)  Muscle weakness: + little, lifting has been a chore for him. It was all fine in 2024, was going to the gym.  Muscle cramps: denies  Temp intolerance: + hot all the time. Gets hot flashes but denies night sweats  Bowels: runny, has about 2 Bms a day  Skin/Hair changes: denies  Vision changes: denies red, dry, teary or gritty eyes  Palpitations - yes  Tremors - yes  Libido - high, as has always been  Biotin use and dose: MV only  Exposure to contrast and radiation: denies  Fhx: sister has a thyroid condition but unsure what kind. No autoimmune conditions in the family  EtOH: drinks alcohol everyday, 4-5 vodka shots daily  Smokes cigars here and there    ROS:  Negative unless noted above      Patient Active Problem List   Diagnosis    Astigmatism, bilateral    Bilateral myopia    Bilateral presbyopia    Cyst of right upper eyelid    Hydrocystoma     "Keratitis sicca, right eye    Blepharitis    Trigger little finger of right hand    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    Isabella's nodes    Decreased range of motion of finger of right hand    Decreased  strength of right hand    History of cannabis dependence/abuse (Multi)    History of cocaine abuse    Generalized anxiety disorder    Fatty (change of) liver, not elsewhere classified    Mixed hyperlipidemia    Marijuana use    Primary hypertension    Stage 3a chronic kidney disease (Multi)    Stenosis of cervical spine with myelopathy    Alcohol abuse    Tachycardia    Disorder of globe    Hyperthyroidism    Overweight    Xerostomia    Spinal stenosis of cervical region     Past Surgical History:   Procedure Laterality Date    CT ANGIO NECK  12/4/2018    CT NECK ANGIO W AND WO IV CONTRAST [place] [facility] [place]    OTHER SURGICAL HISTORY  03/11/2021    Full thickness skin graft     No Known Allergies    Family History   Problem Relation Name Age of Onset    Diabetes Mother      Diabetes Father       Social History     Socioeconomic History    Marital status: Single     Spouse name: Not on file    Number of children: Not on file    Years of education: Not on file    Highest education level: Not on file   Occupational History    Not on file   Tobacco Use    Smoking status: Some Days     Types: Cigars    Smokeless tobacco: Never    Tobacco comments:     Cigars 1-2 x per week    Vaping Use    Vaping status: Never Used   Substance and Sexual Activity    Alcohol use: Yes     Alcohol/week: 2.0 standard drinks of alcohol     Types: 2 Shots of liquor per week     Comment: daily alcohol    Drug use: Not Currently     Types: \"Crack\" cocaine    Sexual activity: Not on file   Other Topics Concern    Not on file   Social History Narrative    Not on file     Social Drivers of Health     Financial Resource Strain: Low Risk  (11/20/2020)    Received from [place] [facility] [facility], [facility]    Overall Financial Resource Strain (CARDIA)     Difficulty of Paying Living Expenses: Not hard at all   Food Insecurity: Not on File (2024)    Received from [facility]    Food Insecurity     Food: 0   Transportation Needs: No Transportation Needs (2020)    Received from [facility], [facility]    PRAPARE - Transportation     Lack of Transportation (Medical): No     Lack of Transportation (Non-Medical): No   Physical Activity: Not on File (2019)    [facility] from [facility]    Physical Activity     Physical Activity: 0   Stress: Not on File (2019)    Received from [facility]    Stress     Stress: 0   Social Connections: Not on File (2024)    Received from [facility]    Social Connections     Connectedness: 0   Intimate Partner Violence: Not on file   Housing Stability: Not on File (2019)    Received from [facility]    Housing Stability     Housin     Vitals:    Vitals:    25 0843   BP: 127/86   BP Location: Right arm   Patient Position: Sitting   Pulse: 102   Weight: 97.5 kg (215 lb)   Height: 1.829 m (6')     Physical Exam:    General: AA male patient in NAD, B/L gynecomastia  HEENT: AT/NC, B/L lid retraction, no chemosis, Stare  Neck: trachea in midline, thickened isthmus, no nodules  Resp: CTA B/L  CVS: +tachycardiac  Abdomen: soft and non tender to palpation, BS+  Skin: warm,  not velvety  Neuro: AAO x3, DTRs brisk + tremors B/L  Psych: cooperative    Medications:    Current Outpatient Medications on File Prior to Visit   Medication Sig Dispense Refill    amLODIPine (Norvasc) 5 mg tablet Take 1 tablet (5 mg) by mouth once daily.      omeprazole (PriLOSEC) 40 mg DR capsule Take 1 capsule (40 mg) by mouth.      omeprazole OTC (PriLOSEC OTC) 20 mg EC tablet Take 2 tablets (40 mg) by mouth once daily in the morning. Take before meals. Do not crush, chew, or split.      propranolol (Inderal) 10 mg tablet Take 1 tablet (10 mg) by mouth 3 times a  day. 90 tablet 2    sildenafil (Viagra) 100 mg tablet Take 1 tablet by mouth daily as needed 30-60 minutes before intercourse      atorvastatin (Lipitor) 20 mg tablet Take 1 tablet (20 mg) by mouth. (Patient not taking: Reported on 4/4/2025)      fenofibrate (Tricor) 48 mg tablet Take 1 tablet (48 mg) by mouth once daily. (Patient not taking: Reported on 4/4/2025)      HYDROcodone-acetaminophen (Norco) 5-325 mg tablet Take 1 tablet by mouth every 6 hours if needed for severe pain (7 - 10). (Patient not taking: Reported on 4/4/2025) 20 tablet 0    multivitamin tablet Take 1 tablet by mouth once daily. (Patient not taking: Reported on 4/4/2025)      triamterene-hydrochlorothiazid (Dyazide) 37.5-25 mg capsule Take 1 capsule by mouth once daily. (Patient not taking: Reported on 4/4/2025)       No current facility-administered medications on file prior to visit.     Labs:     Latest Reference Range & Units 11/25/24 14:04   Thyroid Stimulating Hormone 0.44 - 3.98 mIU/L <0.01 (L)   (L): Data is abnormally low    Assessment and Plan:    The patient is a 60 year old male who presents to the office today to establish care with us for hyperthyroidism which is likely due to Graves Disease. We discussed about the next steps of action with him and explained how excessive thyroid hormones can impact his organ systems.    - Will get TSH, FT4, FT3, TT3, TSI, Trab, TPO, CBC and CMP  - Will start him on medication after we review his labs  - Discussed with him that we will call him with results and a plan of action.    The patient was seen and discussed with Dr. Garcia.    Jens Ulrich MD  PGY-4 Endocrinology Fellow

## 2025-04-05 LAB
ALBUMIN SERPL-MCNC: 4.3 G/DL (ref 3.6–5.1)
ALP SERPL-CCNC: 96 U/L (ref 35–144)
ALT SERPL-CCNC: 36 U/L (ref 9–46)
ANION GAP SERPL CALCULATED.4IONS-SCNC: 9 MMOL/L (CALC) (ref 7–17)
AST SERPL-CCNC: 22 U/L (ref 10–35)
BILIRUB SERPL-MCNC: 0.6 MG/DL (ref 0.2–1.2)
BUN SERPL-MCNC: 16 MG/DL (ref 7–25)
CALCIUM SERPL-MCNC: 9.6 MG/DL (ref 8.6–10.3)
CHLORIDE SERPL-SCNC: 105 MMOL/L (ref 98–110)
CO2 SERPL-SCNC: 26 MMOL/L (ref 20–32)
CREAT SERPL-MCNC: 0.95 MG/DL (ref 0.7–1.35)
EGFRCR SERPLBLD CKD-EPI 2021: 91 ML/MIN/1.73M2
ERYTHROCYTE [DISTWIDTH] IN BLOOD BY AUTOMATED COUNT: 17.2 % (ref 11–15)
GLUCOSE SERPL-MCNC: 99 MG/DL (ref 65–99)
HCT VFR BLD AUTO: 47.8 % (ref 38.5–50)
HGB BLD-MCNC: 14.8 G/DL (ref 13.2–17.1)
MCH RBC QN AUTO: 21.7 PG (ref 27–33)
MCHC RBC AUTO-ENTMCNC: 31 G/DL (ref 32–36)
MCV RBC AUTO: 70 FL (ref 80–100)
PLATELET # BLD AUTO: 161 THOUSAND/UL (ref 140–400)
PMV BLD REES-ECKER: ABNORMAL FL
POTASSIUM SERPL-SCNC: 4.6 MMOL/L (ref 3.5–5.3)
PROT SERPL-MCNC: 7 G/DL (ref 6.1–8.1)
RBC # BLD AUTO: 6.83 MILLION/UL (ref 4.2–5.8)
SODIUM SERPL-SCNC: 140 MMOL/L (ref 135–146)
T3 SERPL-MCNC: 291 NG/DL (ref 76–181)
T3FREE SERPL-MCNC: 13.3 PG/ML (ref 2.3–4.2)
T4 FREE SERPL-MCNC: 3.4 NG/DL (ref 0.8–1.8)
THYROPEROXIDASE AB SERPL-ACNC: NORMAL [IU]/ML
TSH BII SER-ACNC: NORMAL
TSH SERPL-ACNC: <0.01 MIU/L (ref 0.4–4.5)
TSI SER-ACNC: NORMAL
WBC # BLD AUTO: 6 THOUSAND/UL (ref 3.8–10.8)

## 2025-04-06 DIAGNOSIS — E05.00 GRAVES DISEASE: Primary | ICD-10-CM

## 2025-04-06 RX ORDER — METHIMAZOLE 5 MG/1
TABLET ORAL
Qty: 154 TABLET | Refills: 1 | Status: SHIPPED | OUTPATIENT
Start: 2025-04-06

## 2025-04-06 NOTE — PROGRESS NOTES
Latest Reference Range & Units 11/25/24 14:04 04/04/25 09:51   T3, TOTAL 76 - 181 ng/dL  291 (H)   T3, FREE 2.3 - 4.2 pg/mL  13.3 (H)   T4, FREE 0.8 - 1.8 ng/dL  3.4 (H)   Thyroid Stimulating Hormone 0.44 - 3.98 mIU/L <0.01 (L)    (H): Data is abnormally high  (L): Data is abnormally low    Reviewed the labs. It was discussed with the patient that he needs treatment with MMI. Will start him on MMI 10 mg TID x 2 weeks followed by 10 mg BID x 2 weeks followed by 5 mg BID. We will get labs every 3 weeks for now till he is on a stable dose for dose adjustment. He will also need to be on beta blocker for rate control. He already has an existing prescription for propranolol.    He did not receive my calls. He is not on Mychart so I will send him a letter.

## 2025-04-08 LAB
ALBUMIN SERPL-MCNC: 4.3 G/DL (ref 3.6–5.1)
ALP SERPL-CCNC: 96 U/L (ref 35–144)
ALT SERPL-CCNC: 36 U/L (ref 9–46)
ANION GAP SERPL CALCULATED.4IONS-SCNC: 9 MMOL/L (CALC) (ref 7–17)
AST SERPL-CCNC: 22 U/L (ref 10–35)
BILIRUB SERPL-MCNC: 0.6 MG/DL (ref 0.2–1.2)
BUN SERPL-MCNC: 16 MG/DL (ref 7–25)
CALCIUM SERPL-MCNC: 9.6 MG/DL (ref 8.6–10.3)
CHLORIDE SERPL-SCNC: 105 MMOL/L (ref 98–110)
CO2 SERPL-SCNC: 26 MMOL/L (ref 20–32)
CREAT SERPL-MCNC: 0.95 MG/DL (ref 0.7–1.35)
EGFRCR SERPLBLD CKD-EPI 2021: 91 ML/MIN/1.73M2
ERYTHROCYTE [DISTWIDTH] IN BLOOD BY AUTOMATED COUNT: 17.2 % (ref 11–15)
GLUCOSE SERPL-MCNC: 99 MG/DL (ref 65–99)
HCT VFR BLD AUTO: 47.8 % (ref 38.5–50)
HGB BLD-MCNC: 14.8 G/DL (ref 13.2–17.1)
MCH RBC QN AUTO: 21.7 PG (ref 27–33)
MCHC RBC AUTO-ENTMCNC: 31 G/DL (ref 32–36)
MCV RBC AUTO: 70 FL (ref 80–100)
PLATELET # BLD AUTO: 161 THOUSAND/UL (ref 140–400)
PMV BLD REES-ECKER: ABNORMAL FL
POTASSIUM SERPL-SCNC: 4.6 MMOL/L (ref 3.5–5.3)
PROT SERPL-MCNC: 7 G/DL (ref 6.1–8.1)
RBC # BLD AUTO: 6.83 MILLION/UL (ref 4.2–5.8)
SODIUM SERPL-SCNC: 140 MMOL/L (ref 135–146)
T3 SERPL-MCNC: 291 NG/DL (ref 76–181)
T3FREE SERPL-MCNC: 13.3 PG/ML (ref 2.3–4.2)
T4 FREE SERPL-MCNC: 3.4 NG/DL (ref 0.8–1.8)
THYROPEROXIDASE AB SERPL-ACNC: <1 IU/ML
TSH BII SER-ACNC: 12.36 IU/L
TSH SERPL-ACNC: <0.01 MIU/L (ref 0.4–4.5)
TSI SER-ACNC: 202 % BASELINE
WBC # BLD AUTO: 6 THOUSAND/UL (ref 3.8–10.8)

## 2025-04-27 DIAGNOSIS — E05.00 GRAVES DISEASE: ICD-10-CM

## 2025-05-18 DIAGNOSIS — E05.00 GRAVES DISEASE: ICD-10-CM

## 2025-06-08 DIAGNOSIS — E05.00 GRAVES DISEASE: ICD-10-CM

## 2025-06-29 DIAGNOSIS — E05.00 GRAVES DISEASE: ICD-10-CM

## 2025-11-03 ENCOUNTER — APPOINTMENT (OUTPATIENT)
Dept: GASTROENTEROLOGY | Facility: HOSPITAL | Age: 60
End: 2025-11-03
Payer: COMMERCIAL

## (undated) DEVICE — TRAY, DRY PREP, PREMIUM

## (undated) DEVICE — CUFF, TOURNIQUET, 18 X 4, DUAL PORT/SNGL BLADDER, DISP, LF

## (undated) DEVICE — Device

## (undated) DEVICE — DRESSING, GAUZE, PETROLATUM, PATCH, XEROFORM, 1 X 8 IN, STERILE

## (undated) DEVICE — GLOVE, SURGICAL, PROTEXIS PI , 7.0, PF, LF

## (undated) DEVICE — BLANKET, LOWER BODY, VHA PLUS, ADULT

## (undated) DEVICE — SUTURE, MONOCRYL, 4-0, 18 IN, PS2, UNDYED

## (undated) DEVICE — GOWN, SURGICAL, SIRUS, NON REINFORCED, LARGE

## (undated) DEVICE — BLADE, SAW, OSCILLATING/SAGITTAL, 11.5 X 5.5 X 0.38 MM

## (undated) DEVICE — SUTURE, CHROMIC, 5-0, 18 IN, G3, DA, BROWN

## (undated) DEVICE — SUTURE, PROLENE, 5-0, 24 IN, RB2, DA, BLUE

## (undated) DEVICE — GLOVE, SURGICAL, PROTEXIS PI , 7.5, PF, LF

## (undated) DEVICE — SOLUTION, CHLORHEXIDINE, 4%, 4OZ

## (undated) DEVICE — PADDING, CAST, SYNTHETC, 3 IN X 4 YD

## (undated) DEVICE — TUBING, SUCTION, 6MM X 10, CLEAN N-COND

## (undated) DEVICE — SUTURE, VICRYL, 4-0, 18 IN, PS-4, UNDYED